# Patient Record
Sex: FEMALE | Race: WHITE | NOT HISPANIC OR LATINO | Employment: FULL TIME | URBAN - METROPOLITAN AREA
[De-identification: names, ages, dates, MRNs, and addresses within clinical notes are randomized per-mention and may not be internally consistent; named-entity substitution may affect disease eponyms.]

---

## 2017-07-24 ENCOUNTER — HOSPITAL ENCOUNTER (OUTPATIENT)
Dept: URGENT CARE | Facility: CLINIC | Age: 37
Setting detail: SPECIMEN
Discharge: HOME | End: 2017-07-24
Attending: FAMILY MEDICINE | Admitting: FAMILY MEDICINE

## 2017-07-28 ENCOUNTER — HOSPITAL ENCOUNTER (OUTPATIENT)
Dept: LAB | Facility: HOSPITAL | Age: 37
Discharge: HOME | End: 2017-07-28
Attending: FAMILY MEDICINE | Admitting: FAMILY MEDICINE

## 2017-07-28 LAB
ALBUMIN SERPL-MCNC: 4.5 G/DL (ref 3.5–4.8)
ALBUMIN/GLOB SERPL: 1.4 {RATIO} (ref 1–1.7)
ALP SERPL-CCNC: 36 IU/L (ref 32–91)
ALT SERPL-CCNC: 9 IU/L (ref 14–54)
ANION GAP SERPL CALC-SCNC: 14.9 MMOL/L (ref 10–20)
AST SERPL-CCNC: 17 IU/L (ref 15–41)
BASOPHILS # BLD AUTO: 0 10*3/UL (ref 0–0.2)
BASOPHILS NFR BLD AUTO: 1 % (ref 0–2)
BILIRUB SERPL-MCNC: 0.7 MG/DL (ref 0.3–1.2)
BUN SERPL-MCNC: 11 MG/DL (ref 8–20)
BUN/CREAT SERPL: 7.3 (ref 5.4–26.2)
CALCIUM SERPL-MCNC: 9.6 MG/DL (ref 8.9–10.3)
CHLORIDE SERPL-SCNC: 107 MMOL/L (ref 101–111)
CONV CO2: 20 MMOL/L (ref 22–32)
CONV TOTAL PROTEIN: 7.7 G/DL (ref 6.1–7.9)
CREAT UR-MCNC: 1.5 MG/DL (ref 0.4–1)
DIFFERENTIAL METHOD BLD: (no result)
EOSINOPHIL # BLD AUTO: 0.4 10*3/UL (ref 0–0.3)
EOSINOPHIL # BLD AUTO: 6 % (ref 0–3)
ERYTHROCYTE [DISTWIDTH] IN BLOOD BY AUTOMATED COUNT: 13.4 % (ref 11.5–14.5)
GLOBULIN UR ELPH-MCNC: 3.2 G/DL (ref 2.5–3.8)
GLUCOSE SERPL-MCNC: 86 MG/DL (ref 65–99)
H PYLORI IGG SER IA-ACNC: NEGATIVE
HCT VFR BLD AUTO: 44.8 % (ref 35–49)
HGB BLD-MCNC: 15.2 G/DL (ref 12–15)
IGA1 MFR SER: 211 MG/DL (ref 50–400)
IGG1 SER-MCNC: 1060 MG/DL (ref 600–1500)
IGM SERPL-MCNC: 97 MG/DL (ref 40–300)
LYMPHOCYTES # BLD AUTO: 2.4 10*3/UL (ref 0.8–4.8)
LYMPHOCYTES NFR BLD AUTO: 32 % (ref 18–42)
MCH RBC QN AUTO: 34.4 PG (ref 26–32)
MCHC RBC AUTO-ENTMCNC: 33.8 G/DL (ref 32–36)
MCV RBC AUTO: 101.7 FL (ref 80–94)
MONOCYTES # BLD AUTO: 0.6 10*3/UL (ref 0.1–1.3)
MONOCYTES NFR BLD AUTO: 8 % (ref 2–11)
NEUTROPHILS # BLD AUTO: 3.9 10*3/UL (ref 2.3–8.6)
NEUTROPHILS NFR BLD AUTO: 53 % (ref 50–75)
NRBC BLD AUTO-RTO: 0 /100{WBCS}
NRBC/RBC NFR BLD MANUAL: 0 10*3/UL
PLATELET # BLD AUTO: 221 10*3/UL (ref 150–450)
PMV BLD AUTO: 7.8 FL (ref 7.4–10.4)
POTASSIUM SERPL-SCNC: 3.9 MMOL/L (ref 3.6–5.1)
RBC # BLD AUTO: 4.4 10*6/UL (ref 4–5.4)
SODIUM SERPL-SCNC: 138 MMOL/L (ref 136–144)
WBC # BLD AUTO: 7.3 10*3/UL (ref 4.5–11.5)

## 2018-12-17 ENCOUNTER — OFFICE (OUTPATIENT)
Dept: URBAN - METROPOLITAN AREA CLINIC 64 | Facility: CLINIC | Age: 38
End: 2018-12-17

## 2018-12-17 VITALS
DIASTOLIC BLOOD PRESSURE: 70 MMHG | WEIGHT: 157 LBS | HEIGHT: 71 IN | HEART RATE: 82 BPM | SYSTOLIC BLOOD PRESSURE: 116 MMHG

## 2018-12-17 DIAGNOSIS — Z72.0 TOBACCO USE: ICD-10-CM

## 2018-12-17 DIAGNOSIS — R10.9 UNSPECIFIED ABDOMINAL PAIN: ICD-10-CM

## 2018-12-17 DIAGNOSIS — K59.00 CONSTIPATION, UNSPECIFIED: ICD-10-CM

## 2018-12-17 DIAGNOSIS — K21.9 GASTRO-ESOPHAGEAL REFLUX DISEASE WITHOUT ESOPHAGITIS: ICD-10-CM

## 2018-12-17 PROCEDURE — 99244 OFF/OP CNSLTJ NEW/EST MOD 40: CPT | Performed by: NURSE PRACTITIONER

## 2018-12-17 RX ORDER — DICYCLOMINE HYDROCHLORIDE 20 MG/1
40 TABLET ORAL
Qty: 60 | Refills: 11 | Status: COMPLETED
Start: 2018-12-17 | End: 2019-09-05

## 2018-12-17 RX ORDER — PANTOPRAZOLE SODIUM 40 MG/1
TABLET, DELAYED RELEASE ORAL
Qty: 30 | Refills: 11 | Status: COMPLETED
End: 2019-09-05

## 2018-12-17 RX ORDER — SORBITOL SOLUTION 70 %
SOLUTION, ORAL MISCELLANEOUS
Qty: 100 | Refills: 0 | Status: COMPLETED
Start: 2018-12-17 | End: 2019-09-05

## 2018-12-17 RX ORDER — PLECANATIDE 3 MG/1
3 TABLET ORAL
Qty: 90 | Refills: 3 | Status: COMPLETED
Start: 2018-12-17 | End: 2019-09-05

## 2019-03-06 ENCOUNTER — HOSPITAL ENCOUNTER (OUTPATIENT)
Dept: PREADMISSION TESTING | Facility: HOSPITAL | Age: 39
Discharge: HOME | End: 2019-03-06
Attending: OBSTETRICS & GYNECOLOGY | Admitting: OBSTETRICS & GYNECOLOGY

## 2019-03-06 LAB
ABO + RH BLD: NORMAL
ANION GAP SERPL CALC-SCNC: 15.1 MMOL/L (ref 10–20)
ARMBAND: NORMAL
BASOPHILS # BLD AUTO: 0.1 10*3/UL (ref 0–0.2)
BASOPHILS NFR BLD AUTO: 1 % (ref 0–2)
BLD COMPONENT TYPE: NORMAL
BLD GP AB SCN SERPL QL: NEGATIVE
BLOOD BANK CMNT PATIENT-IMP: NORMAL
BLOOD BANK CMNT PATIENT-IMP: NORMAL
BUN SERPL-MCNC: 9 MG/DL (ref 8–20)
BUN/CREAT SERPL: 15 (ref 5.4–26.2)
CALCIUM SERPL-MCNC: 9.8 MG/DL (ref 8.9–10.3)
CHLORIDE SERPL-SCNC: 101 MMOL/L (ref 101–111)
CONV CO2: 24 MMOL/L (ref 22–32)
CREAT UR-MCNC: 0.6 MG/DL (ref 0.4–1)
CROSSMATCH EXPIRATION: NORMAL
DIFFERENTIAL METHOD BLD: (no result)
EOSINOPHIL # BLD AUTO: 0.3 10*3/UL (ref 0–0.3)
EOSINOPHIL # BLD AUTO: 3 % (ref 0–3)
ERYTHROCYTE [DISTWIDTH] IN BLOOD BY AUTOMATED COUNT: 12.3 % (ref 11.5–14.5)
GLUCOSE SERPL-MCNC: 89 MG/DL (ref 65–99)
HCT VFR BLD AUTO: 42.1 % (ref 35–49)
HGB BLD-MCNC: 14.5 G/DL (ref 12–15)
LYMPHOCYTES # BLD AUTO: 2.3 10*3/UL (ref 0.8–4.8)
LYMPHOCYTES NFR BLD AUTO: 26 % (ref 18–42)
MCH RBC QN AUTO: 33.7 PG (ref 26–32)
MCHC RBC AUTO-ENTMCNC: 34.3 G/DL (ref 32–36)
MCV RBC AUTO: 98.2 FL (ref 80–94)
MONOCYTES # BLD AUTO: 0.5 10*3/UL (ref 0.1–1.3)
MONOCYTES NFR BLD AUTO: 6 % (ref 2–11)
NEUTROPHILS # BLD AUTO: 5.7 10*3/UL (ref 2.3–8.6)
NEUTROPHILS NFR BLD AUTO: 64 % (ref 50–75)
NRBC BLD AUTO-RTO: 0 /100{WBCS}
NRBC/RBC NFR BLD MANUAL: 0 10*3/UL
PLATELET # BLD AUTO: 223 10*3/UL (ref 150–450)
PMV BLD AUTO: 7.7 FL (ref 7.4–10.4)
POTASSIUM SERPL-SCNC: 4.1 MMOL/L (ref 3.6–5.1)
RBC # BLD AUTO: 4.29 10*6/UL (ref 4–5.4)
SODIUM SERPL-SCNC: 136 MMOL/L (ref 136–144)
WBC # BLD AUTO: 8.9 10*3/UL (ref 4.5–11.5)

## 2019-03-07 ENCOUNTER — HOSPITAL ENCOUNTER (OUTPATIENT)
Dept: PREOP | Facility: HOSPITAL | Age: 39
Setting detail: HOSPITAL OUTPATIENT SURGERY
Discharge: HOME | End: 2019-03-07
Attending: OBSTETRICS & GYNECOLOGY | Admitting: OBSTETRICS & GYNECOLOGY

## 2019-09-05 ENCOUNTER — OFFICE (OUTPATIENT)
Dept: URBAN - METROPOLITAN AREA CLINIC 64 | Facility: CLINIC | Age: 39
End: 2019-09-05

## 2019-09-05 VITALS
DIASTOLIC BLOOD PRESSURE: 89 MMHG | SYSTOLIC BLOOD PRESSURE: 116 MMHG | HEART RATE: 79 BPM | HEIGHT: 71 IN | WEIGHT: 165 LBS

## 2019-09-05 DIAGNOSIS — K59.00 CONSTIPATION, UNSPECIFIED: ICD-10-CM

## 2019-09-05 DIAGNOSIS — R10.30 LOWER ABDOMINAL PAIN, UNSPECIFIED: ICD-10-CM

## 2019-09-05 DIAGNOSIS — K21.9 GASTRO-ESOPHAGEAL REFLUX DISEASE WITHOUT ESOPHAGITIS: ICD-10-CM

## 2019-09-05 PROCEDURE — 99213 OFFICE O/P EST LOW 20 MIN: CPT | Performed by: NURSE PRACTITIONER

## 2019-09-05 RX ORDER — LINACLOTIDE 290 UG/1
290 CAPSULE, GELATIN COATED ORAL
Qty: 90 | Refills: 0 | Status: ACTIVE
Start: 2019-09-05

## 2019-09-05 RX ORDER — SORBITOL SOLUTION 70 %
SOLUTION, ORAL MISCELLANEOUS
Qty: 100 | Refills: 0 | Status: ACTIVE
Start: 2019-09-05

## 2020-11-04 ENCOUNTER — TELEMEDICINE (OUTPATIENT)
Dept: FAMILY MEDICINE CLINIC | Facility: TELEHEALTH | Age: 40
End: 2020-11-04

## 2020-11-04 VITALS — TEMPERATURE: 98.6 F

## 2020-11-04 DIAGNOSIS — J32.9 SINUSITIS, UNSPECIFIED CHRONICITY, UNSPECIFIED LOCATION: Primary | ICD-10-CM

## 2020-11-04 DIAGNOSIS — U07.1 COVID-19: ICD-10-CM

## 2020-11-04 PROCEDURE — 99213 OFFICE O/P EST LOW 20 MIN: CPT | Performed by: NURSE PRACTITIONER

## 2020-11-04 RX ORDER — VALACYCLOVIR HYDROCHLORIDE 500 MG/1
TABLET, FILM COATED ORAL
COMMUNITY
End: 2021-01-18 | Stop reason: SDUPTHER

## 2020-11-04 RX ORDER — FLUTICASONE PROPIONATE 50 MCG
2 SPRAY, SUSPENSION (ML) NASAL DAILY
Qty: 16 G | Refills: 0 | Status: SHIPPED | OUTPATIENT
Start: 2020-11-04 | End: 2021-03-30 | Stop reason: SDUPTHER

## 2020-11-04 RX ORDER — METHYLPREDNISOLONE 4 MG/1
TABLET ORAL
Qty: 1 EACH | Refills: 0 | OUTPATIENT
Start: 2020-11-04 | End: 2021-01-05

## 2020-11-04 RX ORDER — BUPROPION HYDROCHLORIDE 300 MG/1
300 TABLET ORAL EVERY MORNING
COMMUNITY
Start: 2018-08-04 | End: 2022-06-28 | Stop reason: HOSPADM

## 2020-11-04 RX ORDER — BUPROPION HYDROCHLORIDE 150 MG/1
150 TABLET ORAL EVERY MORNING
COMMUNITY
Start: 2018-08-04 | End: 2022-06-28 | Stop reason: HOSPADM

## 2020-11-04 RX ORDER — BENZONATATE 200 MG/1
200 CAPSULE ORAL 3 TIMES DAILY PRN
Qty: 30 CAPSULE | Refills: 0 | Status: SHIPPED | OUTPATIENT
Start: 2020-11-04 | End: 2020-11-14

## 2020-11-04 RX ORDER — DOXYCYCLINE HYCLATE 100 MG/1
100 CAPSULE ORAL 2 TIMES DAILY
Qty: 14 CAPSULE | Refills: 0 | Status: SHIPPED | OUTPATIENT
Start: 2020-11-04 | End: 2020-11-11

## 2020-11-04 NOTE — PROGRESS NOTES
Sasha Ye  1980    Chief Complaint   Patient presents with   • Cough     Covid positive   • Fever       HPI  Sasha Ye is a 40 y.o. female with complaints of chest congestion, dry cough, headache, nasal blockage, post nasal drip, sinus and nasal congestion, bilateral sinus pain and sore throat. She reports onset of symptoms on 10/28/2020 and she was tested for Covid and was positive. Symptoms began 1 week ago. Associated symptoms include facial pain bilateral maxillary and productive cough. She states that Headache, nasal congestion and sinus pain/pressure have increased over the last 2 days and she can't breath well through her nose, making it difficult for her to rest and feel more short of breath. She was using flonase but has been out for awhile. Patient denies chills and fever. Patient denies a history of asthma. Patient does smoke cigarettes.       The following portions of the patient's history were reviewed and updated as appropriate: allergies, current medications, past social history and problem list.    No past medical history on file.  Social History     Socioeconomic History   • Marital status: Single     Spouse name: Not on file   • Number of children: Not on file   • Years of education: Not on file   • Highest education level: Not on file       REVIEW OF SYSTEMS  History obtained from the patient  General ROS: negative for - chills or fever, positive for fatigue  ENT ROS: positive for - headaches, nasal congestion and sinus pain  negative for - sneezing, sore throat or vertigo  Allergy and Immunology ROS: positive for - nasal congestion, postnasal drip and seasonal allergies  negative for - itchy/watery eyes  Respiratory ROS: positive for - cough  negative for - shortness of breath or wheezing    PHYSICAL EXAM  Temp 98.6 °F (37 °C) (Oral)   LMP 11/04/2020 (Exact Date)   Breastfeeding No     CONSTITUTIONAL:alert, well appearing, and in no distress  ENT:pharynx erythematous without  exudate, maxillary sinus tenderness bilaterally  CHEST:respiratory effort normal  PSYCH:alert, normal affect and speech     Diagnoses and all orders for this visit:    1. Sinusitis, unspecified chronicity, unspecified location (Primary)  -     doxycycline (VIBRAMYCIN) 100 MG capsule; Take 1 capsule by mouth 2 (Two) Times a Day for 7 days.  Dispense: 14 capsule; Refill: 0  -     fluticasone (Flonase) 50 MCG/ACT nasal spray; 2 sprays into the nostril(s) as directed by provider Daily for 30 days. Administer 2 sprays in each nostril for each dose.  Dispense: 16 g; Refill: 0    2. COVID-19  -     methylPREDNISolone (MEDROL) 4 MG dose pack; Take as directed on package instructions.  Dispense: 1 each; Refill: 0  -     benzonatate (TESSALON) 200 MG capsule; Take 1 capsule by mouth 3 (Three) Times a Day As Needed for Cough for up to 10 days.  Dispense: 30 capsule; Refill: 0        [id number], [place] - [address] AT [facility] - [phone]  - [phone]   [address] [place] [address]  Phone: [phone] Fax: [phone]      This visit was performed via Telehealth.  Take antibiotic until gone. May take probiotic with antibiotic if prone to antibiotic induced diarrhea. Continue Flonase daily. Once infection is cleared, start Claritin, Zyrtec or Allegra daily and stay on through allergy season. Cool mist humidifier at bedside. Follow up with PCP, Urgent Care or Video Visit if symptoms have not resolved in 7-10 days. If symptoms worse, go to Urgent Care or follow up with PCP. If you develop high fever, chest pain, shortness of breath or any life-threatening symptoms, go to nearest Emergency Department.           Micaela Wakefield, APRN  11/04/20  15:10 EST

## 2020-11-04 NOTE — PATIENT INSTRUCTIONS
Sinusitis, Adult  Sinusitis is soreness and swelling (inflammation) of your sinuses. Sinuses are hollow spaces in the bones around your face. They are located:  · Around your eyes.  · In the middle of your forehead.  · Behind your nose.  · In your cheekbones.  Your sinuses and nasal passages are lined with a fluid called mucus. Mucus drains out of your sinuses. Swelling can trap mucus in your sinuses. This lets germs (bacteria, virus, or fungus) grow, which leads to infection. Most of the time, this condition is caused by a virus.  What are the causes?  This condition is caused by:  · Allergies.  · Asthma.  · Germs.  · Things that block your nose or sinuses.  · Growths in the nose (nasal polyps).  · Chemicals or irritants in the air.  · Fungus (rare).  What increases the risk?  You are more likely to develop this condition if:  · You have a weak body defense system (immune system).  · You do a lot of swimming or diving.  · You use nasal sprays too much.  · You smoke.  What are the signs or symptoms?  The main symptoms of this condition are pain and a feeling of pressure around the sinuses. Other symptoms include:  · Stuffy nose (congestion).  · Runny nose (drainage).  · Swelling and warmth in the sinuses.  · Headache.  · Toothache.  · A cough that may get worse at night.  · Mucus that collects in the throat or the back of the nose (postnasal drip).  · Being unable to smell and taste.  · Being very tired (fatigue).  · A fever.  · Sore throat.  · Bad breath.  How is this diagnosed?  This condition is diagnosed based on:  · Your symptoms.  · Your medical history.  · A physical exam.  · Tests to find out if your condition is short-term (acute) or long-term (chronic). Your doctor may:  ? Check your nose for growths (polyps).  ? Check your sinuses using a tool that has a light (endoscope).  ? Check for allergies or germs.  ? Do imaging tests, such as an MRI or CT scan.  How is this treated?  Treatment for this condition  depends on the cause and whether it is short-term or long-term.  · If caused by a virus, your symptoms should go away on their own within 10 days. You may be given medicines to relieve symptoms. They include:  ? Medicines that shrink swollen tissue in the nose.  ? Medicines that treat allergies (antihistamines).  ? A spray that treats swelling of the nostrils.   ? Rinses that help get rid of thick mucus in your nose (nasal saline washes).  · If caused by bacteria, your doctor may wait to see if you will get better without treatment. You may be given antibiotic medicine if you have:  ? A very bad infection.  ? A weak body defense system.  · If caused by growths in the nose, you may need to have surgery.  Follow these instructions at home:  Medicines  · Take, use, or apply over-the-counter and prescription medicines only as told by your doctor. These may include nasal sprays.  · If you were prescribed an antibiotic medicine, take it as told by your doctor. Do not stop taking the antibiotic even if you start to feel better.  Hydrate and humidify    · Drink enough water to keep your pee (urine) pale yellow.  · Use a cool mist humidifier to keep the humidity level in your home above 50%.  · Breathe in steam for 10-15 minutes, 3-4 times a day, or as told by your doctor. You can do this in the bathroom while a hot shower is running.  · Try not to spend time in cool or dry air.  Rest  · Rest as much as you can.  · Sleep with your head raised (elevated).  · Make sure you get enough sleep each night.  General instructions    · Put a warm, moist washcloth on your face 3-4 times a day, or as often as told by your doctor. This will help with discomfort.  · Wash your hands often with soap and water. If there is no soap and water, use hand .  · Do not smoke. Avoid being around people who are smoking (secondhand smoke).  · Keep all follow-up visits as told by your doctor. This is important.  Contact a doctor if:  · You  have a fever.  · Your symptoms get worse.  · Your symptoms do not get better within 10 days.  Get help right away if:  · You have a very bad headache.  · You cannot stop throwing up (vomiting).  · You have very bad pain or swelling around your face or eyes.  · You have trouble seeing.  · You feel confused.  · Your neck is stiff.  · You have trouble breathing.  Summary  · Sinusitis is swelling of your sinuses. Sinuses are hollow spaces in the bones around your face.  · This condition is caused by tissues in your nose that become inflamed or swollen. This traps germs. These can lead to infection.  · If you were prescribed an antibiotic medicine, take it as told by your doctor. Do not stop taking it even if you start to feel better.  · Keep all follow-up visits as told by your doctor. This is important.  This information is not intended to replace advice given to you by your health care provider. Make sure you discuss any questions you have with your health care provider.  Document Released: 06/05/2009 Document Revised: 05/20/2019 Document Reviewed: 05/20/2019  [employer] Patient Education © 2020 [employer]

## 2021-01-08 ENCOUNTER — TELEPHONE (OUTPATIENT)
Dept: URGENT CARE | Facility: CLINIC | Age: 41
End: 2021-01-08

## 2021-01-09 NOTE — TELEPHONE ENCOUNTER
Please contact the patient and encourage her to have blood tests drawn.  If she has any questions or concerns, I would be happy to speak with her.

## 2021-01-18 ENCOUNTER — OFFICE VISIT (OUTPATIENT)
Dept: FAMILY MEDICINE CLINIC | Facility: CLINIC | Age: 41
End: 2021-01-18

## 2021-01-18 VITALS
DIASTOLIC BLOOD PRESSURE: 87 MMHG | WEIGHT: 176 LBS | BODY MASS INDEX: 24.64 KG/M2 | HEART RATE: 86 BPM | HEIGHT: 71 IN | TEMPERATURE: 97.1 F | SYSTOLIC BLOOD PRESSURE: 122 MMHG

## 2021-01-18 DIAGNOSIS — Z00.00 WELLNESS EXAMINATION: ICD-10-CM

## 2021-01-18 DIAGNOSIS — U07.1 COVID-19 VIRUS INFECTION: ICD-10-CM

## 2021-01-18 PROCEDURE — 99385 PREV VISIT NEW AGE 18-39: CPT | Performed by: FAMILY MEDICINE

## 2021-01-18 RX ORDER — ZOLPIDEM TARTRATE 10 MG/1
10 TABLET ORAL NIGHTLY PRN
COMMUNITY
Start: 2020-12-04 | End: 2022-06-28 | Stop reason: HOSPADM

## 2021-01-18 RX ORDER — TOPIRAMATE 100 MG/1
300 TABLET, FILM COATED ORAL DAILY
COMMUNITY
Start: 2020-10-19 | End: 2022-06-28 | Stop reason: HOSPADM

## 2021-01-18 RX ORDER — VALACYCLOVIR HYDROCHLORIDE 1 G/1
1000 TABLET, FILM COATED ORAL DAILY
COMMUNITY
Start: 2021-01-14 | End: 2022-06-28 | Stop reason: HOSPADM

## 2021-01-18 RX ORDER — PREDNISONE 10 MG/1
10 TABLET ORAL 2 TIMES DAILY
Qty: 10 TABLET | Refills: 0 | Status: SHIPPED | OUTPATIENT
Start: 2021-01-18 | End: 2021-03-30

## 2021-01-18 NOTE — PROGRESS NOTES
"Subjective   Sasha Ye is a 40 y.o. female and is here for a comprehensive physical exam. The patient reports problems - she \"failed\" her biometric screen at work a few weeks ago - high cholesterol, high blood pressure.  She had COVID19 infection a few months ago and has had some shortness of breath since then.  Increased stress.  Her boyfriend's daughter had COVD a  few weeks ago. Irregular periods since COVID.  She is using mdi that has helped some. Weight gain..    Do you take any herbs or supplements that were not prescribed by a doctor? no  Are you taking calcium supplements? no  Are you taking aspirin daily? no    The following portions of the patient's history were reviewed and updated as appropriate: allergies, current medications, past family history, past medical history, past social history, past surgical history and problem list.    Review of Systems  Do you have pain that bothers you in your daily life? not asked  Pertinent items are noted in HPI.    Objective   /87 (BP Location: Left arm, Patient Position: Sitting, Cuff Size: Adult)   Pulse 86   Temp 97.1 °F (36.2 °C)   Ht 179.1 cm (70.5\")   Wt 79.8 kg (176 lb)   LMP 01/03/2021   BMI 24.90 kg/m²   General appearance: alert, appears stated age and cooperative  Head: Normocephalic, without obvious abnormality, atraumatic  Eyes: conjunctivae/corneas clear. PERRL, EOM's intact. Fundi benign.  Ears: normal TM's and external ear canals both ears  Throat: lips, mucosa, and tongue normal; teeth and gums normal  Neck: no adenopathy, no JVD, supple, symmetrical, trachea midline and thyroid not enlarged, symmetric, no tenderness/mass/nodules  Lungs: clear to auscultation bilaterally  Heart: regular rate and rhythm, S1, S2 normal, no murmur, click, rub or gallop  Abdomen: soft, non-tender; bowel sounds normal; no masses,  no organomegaly  Extremities: extremities normal, atraumatic, no cyanosis or edema  Skin: Skin color, texture, turgor normal. " No rashes or lesions  Neurologic: Grossly normal     No visits with results within 1 Week(s) from this visit.   Latest known visit with results is:   No results found for any previous visit.       Assessment/Plan   Healthy female exam.    Diagnoses and all orders for this visit:    1. Wellness examination    2. COVID-19 virus infection  -     predniSONE (DELTASONE) 10 MG tablet; Take 1 tablet by mouth 2 (Two) Times a Day.  Dispense: 10 tablet; Refill: 0      1. Well exam  2. Patient Counseling:  --Nutrition: Stressed importance of moderation in sodium/caffeine intake, saturated fat and cholesterol, caloric balance, sufficient intake of fresh fruits, vegetables, fiber, calcium, iron, and 1 mg of folate supplement per day (for females capable of pregnancy).  --Exercise: Stressed the importance of regular exercise.   --Dental health: Discussed importance of regular tooth brushing, flossing, and dental visits.  --Immunizations reviewed.    3. Discussed the patient's BMI with her.  The BMI is in the acceptable range  4. Follow up in one year

## 2021-03-30 ENCOUNTER — OFFICE VISIT (OUTPATIENT)
Dept: FAMILY MEDICINE CLINIC | Facility: CLINIC | Age: 41
End: 2021-03-30

## 2021-03-30 VITALS
SYSTOLIC BLOOD PRESSURE: 119 MMHG | BODY MASS INDEX: 24.75 KG/M2 | TEMPERATURE: 97.1 F | WEIGHT: 175 LBS | DIASTOLIC BLOOD PRESSURE: 87 MMHG | OXYGEN SATURATION: 97 % | HEART RATE: 92 BPM

## 2021-03-30 DIAGNOSIS — J01.00 ACUTE NON-RECURRENT MAXILLARY SINUSITIS: Primary | ICD-10-CM

## 2021-03-30 DIAGNOSIS — J32.9 SINUSITIS, UNSPECIFIED CHRONICITY, UNSPECIFIED LOCATION: ICD-10-CM

## 2021-03-30 PROCEDURE — 99213 OFFICE O/P EST LOW 20 MIN: CPT | Performed by: FAMILY MEDICINE

## 2021-03-30 RX ORDER — FLUTICASONE PROPIONATE 50 MCG
2 SPRAY, SUSPENSION (ML) NASAL DAILY
Qty: 16 G | Refills: 2 | Status: SHIPPED | OUTPATIENT
Start: 2021-03-30 | End: 2021-03-30

## 2021-03-30 RX ORDER — FLUTICASONE PROPIONATE 50 MCG
SPRAY, SUSPENSION (ML) NASAL
Qty: 48 G | Refills: 1 | Status: ON HOLD | OUTPATIENT
Start: 2021-03-30 | End: 2022-06-27

## 2021-03-30 RX ORDER — DOXYCYCLINE 100 MG/1
100 CAPSULE ORAL 2 TIMES DAILY
Qty: 20 CAPSULE | Refills: 0 | Status: ON HOLD | OUTPATIENT
Start: 2021-03-30 | End: 2022-06-27

## 2021-03-30 NOTE — PROGRESS NOTES
Chief Complaint  Sinusitis    Subjective          Sasha Ye presents to [facility]  Sinusitis  This is a new problem. The current episode started 1 to 4 weeks ago. The problem has been gradually worsening since onset. There has been no fever. Associated symptoms include congestion, ear pain, headaches, a hoarse voice, sinus pressure and a sore throat. Treatments tried: antihistamines. The treatment provided no relief.       Objective   Vital Signs:   /87 (BP Location: Left arm, Patient Position: Sitting, Cuff Size: Adult)   Pulse 92   Temp 97.1 °F (36.2 °C)   Wt 79.4 kg (175 lb)   SpO2 97%   BMI 24.75 kg/m²     Physical Exam  Vitals reviewed.   Constitutional:       General: She is not in acute distress.     Appearance: She is well-developed.   HENT:      Head: Normocephalic.      Left Ear: Tympanic membrane is injected.      Mouth/Throat:      Pharynx: Posterior oropharyngeal erythema present.   Eyes:      General: Lids are normal.      Conjunctiva/sclera: Conjunctivae normal.   Neck:      Thyroid: No thyroid mass or thyromegaly.      Trachea: Trachea normal.   Cardiovascular:      Rate and Rhythm: Normal rate and regular rhythm.      Heart sounds: Normal heart sounds.   Pulmonary:      Effort: Pulmonary effort is normal.      Breath sounds: Normal breath sounds.   Musculoskeletal:      Cervical back: Normal range of motion.   Lymphadenopathy:      Cervical: No cervical adenopathy.   Skin:     General: Skin is warm and dry.   Neurological:      Mental Status: She is alert and oriented to person, place, and time.   Psychiatric:         Attention and Perception: She is attentive.         Mood and Affect: Mood normal.         Speech: Speech normal.         Behavior: Behavior normal.        Result Review :                 Assessment and Plan    Diagnoses and all orders for this visit:    1. Acute non-recurrent maxillary sinusitis (Primary)  -     doxycycline (MONODOX)  100 MG capsule; Take 1 capsule by mouth 2 (Two) Times a Day.  Dispense: 20 capsule; Refill: 0    2. Sinusitis, unspecified chronicity, unspecified location  -     fluticasone (Flonase) 50 MCG/ACT nasal spray; 2 sprays into the nostril(s) as directed by provider Daily for 30 days. Administer 2 sprays in each nostril for each dose.  Dispense: 16 g; Refill: 2        Follow Up   No follow-ups on file.  Patient was given instructions and counseling regarding her condition or for health maintenance advice. Please see specific information pulled into the AVS if appropriate.

## 2022-06-26 ENCOUNTER — HOSPITAL ENCOUNTER (INPATIENT)
Facility: HOSPITAL | Age: 42
LOS: 2 days | Discharge: PSYCHIATRIC OR SUBSTANCE USE FACILITY | End: 2022-06-28
Attending: EMERGENCY MEDICINE | Admitting: INTERNAL MEDICINE

## 2022-06-26 DIAGNOSIS — T14.91XA SUICIDE ATTEMPT: Primary | ICD-10-CM

## 2022-06-26 DIAGNOSIS — T43.292A: ICD-10-CM

## 2022-06-26 PROBLEM — Z72.0 TOBACCO USE: Status: ACTIVE | Noted: 2022-06-26

## 2022-06-26 LAB
ALBUMIN SERPL-MCNC: 3.9 G/DL (ref 3.5–5.2)
ALBUMIN SERPL-MCNC: 4.4 G/DL (ref 3.5–5.2)
ALBUMIN/GLOB SERPL: 1.4 G/DL
ALBUMIN/GLOB SERPL: 1.9 G/DL
ALP SERPL-CCNC: 42 U/L (ref 39–117)
ALP SERPL-CCNC: 52 U/L (ref 39–117)
ALT SERPL W P-5'-P-CCNC: 10 U/L (ref 1–33)
ALT SERPL W P-5'-P-CCNC: 12 U/L (ref 1–33)
AMPHET+METHAMPHET UR QL: POSITIVE
ANION GAP SERPL CALCULATED.3IONS-SCNC: 15 MMOL/L (ref 5–15)
ANION GAP SERPL CALCULATED.3IONS-SCNC: 15 MMOL/L (ref 5–15)
APAP SERPL-MCNC: <5 MCG/ML (ref 0–30)
AST SERPL-CCNC: 18 U/L (ref 1–32)
AST SERPL-CCNC: 20 U/L (ref 1–32)
BARBITURATES UR QL SCN: NEGATIVE
BASOPHILS # BLD AUTO: 0 10*3/MM3 (ref 0–0.2)
BASOPHILS NFR BLD AUTO: 0.1 % (ref 0–1.5)
BASOPHILS NFR BLD AUTO: 0.2 % (ref 0–1.5)
BASOPHILS NFR BLD AUTO: 0.3 % (ref 0–1.5)
BENZODIAZ UR QL SCN: NEGATIVE
BILIRUB SERPL-MCNC: 0.2 MG/DL (ref 0–1.2)
BILIRUB SERPL-MCNC: 0.6 MG/DL (ref 0–1.2)
BUN SERPL-MCNC: 7 MG/DL (ref 6–20)
BUN SERPL-MCNC: 8 MG/DL (ref 6–20)
BUN/CREAT SERPL: 11.1 (ref 7–25)
BUN/CREAT SERPL: 8.8 (ref 7–25)
CA-I SERPL ISE-MCNC: 1.24 MMOL/L (ref 1.2–1.3)
CALCIUM SPEC-SCNC: 8.4 MG/DL (ref 8.6–10.5)
CALCIUM SPEC-SCNC: 8.4 MG/DL (ref 8.6–10.5)
CANNABINOIDS SERPL QL: POSITIVE
CHLORIDE SERPL-SCNC: 107 MMOL/L (ref 98–107)
CHLORIDE SERPL-SCNC: 109 MMOL/L (ref 98–107)
CK SERPL-CCNC: 221 U/L (ref 20–180)
CO2 SERPL-SCNC: 17 MMOL/L (ref 22–29)
CO2 SERPL-SCNC: 19 MMOL/L (ref 22–29)
COCAINE UR QL: NEGATIVE
CREAT SERPL-MCNC: 0.72 MG/DL (ref 0.57–1)
CREAT SERPL-MCNC: 0.8 MG/DL (ref 0.57–1)
D-LACTATE SERPL-SCNC: 1.2 MMOL/L (ref 0.5–2)
D-LACTATE SERPL-SCNC: 1.3 MMOL/L (ref 0.5–2)
DEPRECATED RDW RBC AUTO: 44.2 FL (ref 37–54)
DEPRECATED RDW RBC AUTO: 45.9 FL (ref 37–54)
DEPRECATED RDW RBC AUTO: 46.4 FL (ref 37–54)
EGFRCR SERPLBLD CKD-EPI 2021: 107.9 ML/MIN/1.73
EGFRCR SERPLBLD CKD-EPI 2021: 95.1 ML/MIN/1.73
EOSINOPHIL # BLD AUTO: 0 10*3/MM3 (ref 0–0.4)
EOSINOPHIL # BLD AUTO: 0 10*3/MM3 (ref 0–0.4)
EOSINOPHIL # BLD AUTO: 0.2 10*3/MM3 (ref 0–0.4)
EOSINOPHIL NFR BLD AUTO: 0.1 % (ref 0.3–6.2)
EOSINOPHIL NFR BLD AUTO: 0.1 % (ref 0.3–6.2)
EOSINOPHIL NFR BLD AUTO: 4 % (ref 0.3–6.2)
ERYTHROCYTE [DISTWIDTH] IN BLOOD BY AUTOMATED COUNT: 12.9 % (ref 12.3–15.4)
ERYTHROCYTE [DISTWIDTH] IN BLOOD BY AUTOMATED COUNT: 13.2 % (ref 12.3–15.4)
ERYTHROCYTE [DISTWIDTH] IN BLOOD BY AUTOMATED COUNT: 13.3 % (ref 12.3–15.4)
ETHANOL UR QL: 0.05 %
GLOBULIN UR ELPH-MCNC: 2.3 GM/DL
GLOBULIN UR ELPH-MCNC: 2.7 GM/DL
GLUCOSE SERPL-MCNC: 88 MG/DL (ref 65–99)
GLUCOSE SERPL-MCNC: 88 MG/DL (ref 65–99)
HCG INTACT+B SERPL-ACNC: <1 MIU/ML
HCT VFR BLD AUTO: 39.8 % (ref 34–46.6)
HCT VFR BLD AUTO: 41.1 % (ref 34–46.6)
HCT VFR BLD AUTO: 42.5 % (ref 34–46.6)
HGB BLD-MCNC: 13.7 G/DL (ref 12–15.9)
HGB BLD-MCNC: 14.2 G/DL (ref 12–15.9)
HGB BLD-MCNC: 14.7 G/DL (ref 12–15.9)
LYMPHOCYTES # BLD AUTO: 1.1 10*3/MM3 (ref 0.7–3.1)
LYMPHOCYTES # BLD AUTO: 1.4 10*3/MM3 (ref 0.7–3.1)
LYMPHOCYTES # BLD AUTO: 1.6 10*3/MM3 (ref 0.7–3.1)
LYMPHOCYTES NFR BLD AUTO: 10.6 % (ref 19.6–45.3)
LYMPHOCYTES NFR BLD AUTO: 14.8 % (ref 19.6–45.3)
LYMPHOCYTES NFR BLD AUTO: 28.4 % (ref 19.6–45.3)
MAGNESIUM SERPL-MCNC: 2 MG/DL (ref 1.6–2.6)
MAGNESIUM SERPL-MCNC: 2.2 MG/DL (ref 1.6–2.6)
MCH RBC QN AUTO: 33.9 PG (ref 26.6–33)
MCH RBC QN AUTO: 34 PG (ref 26.6–33)
MCH RBC QN AUTO: 34.3 PG (ref 26.6–33)
MCHC RBC AUTO-ENTMCNC: 34.4 G/DL (ref 31.5–35.7)
MCHC RBC AUTO-ENTMCNC: 34.5 G/DL (ref 31.5–35.7)
MCHC RBC AUTO-ENTMCNC: 34.5 G/DL (ref 31.5–35.7)
MCV RBC AUTO: 98.2 FL (ref 79–97)
MCV RBC AUTO: 98.8 FL (ref 79–97)
MCV RBC AUTO: 99.4 FL (ref 79–97)
METHADONE UR QL SCN: NEGATIVE
MONOCYTES # BLD AUTO: 0.4 10*3/MM3 (ref 0.1–0.9)
MONOCYTES # BLD AUTO: 0.6 10*3/MM3 (ref 0.1–0.9)
MONOCYTES # BLD AUTO: 0.6 10*3/MM3 (ref 0.1–0.9)
MONOCYTES NFR BLD AUTO: 10.9 % (ref 5–12)
MONOCYTES NFR BLD AUTO: 3.7 % (ref 5–12)
MONOCYTES NFR BLD AUTO: 6.6 % (ref 5–12)
NEUTROPHILS NFR BLD AUTO: 3.1 10*3/MM3 (ref 1.7–7)
NEUTROPHILS NFR BLD AUTO: 56.4 % (ref 42.7–76)
NEUTROPHILS NFR BLD AUTO: 7.5 10*3/MM3 (ref 1.7–7)
NEUTROPHILS NFR BLD AUTO: 78.4 % (ref 42.7–76)
NEUTROPHILS NFR BLD AUTO: 8.8 10*3/MM3 (ref 1.7–7)
NEUTROPHILS NFR BLD AUTO: 85.4 % (ref 42.7–76)
NRBC BLD AUTO-RTO: 0 /100 WBC (ref 0–0.2)
OPIATES UR QL: NEGATIVE
OXYCODONE UR QL SCN: NEGATIVE
PHOSPHATE SERPL-MCNC: 2.3 MG/DL (ref 2.5–4.5)
PLATELET # BLD AUTO: 224 10*3/MM3 (ref 140–450)
PLATELET # BLD AUTO: 230 10*3/MM3 (ref 140–450)
PLATELET # BLD AUTO: 250 10*3/MM3 (ref 140–450)
PMV BLD AUTO: 6.9 FL (ref 6–12)
PMV BLD AUTO: 7.4 FL (ref 6–12)
PMV BLD AUTO: 7.5 FL (ref 6–12)
POTASSIUM SERPL-SCNC: 3.6 MMOL/L (ref 3.5–5.2)
POTASSIUM SERPL-SCNC: 3.7 MMOL/L (ref 3.5–5.2)
PROT SERPL-MCNC: 6.6 G/DL (ref 6–8.5)
PROT SERPL-MCNC: 6.7 G/DL (ref 6–8.5)
RBC # BLD AUTO: 4.02 10*6/MM3 (ref 3.77–5.28)
RBC # BLD AUTO: 4.13 10*6/MM3 (ref 3.77–5.28)
RBC # BLD AUTO: 4.33 10*6/MM3 (ref 3.77–5.28)
SALICYLATES SERPL-MCNC: <0.3 MG/DL
SARS-COV-2 RNA PNL SPEC NAA+PROBE: NOT DETECTED
SODIUM SERPL-SCNC: 141 MMOL/L (ref 136–145)
SODIUM SERPL-SCNC: 141 MMOL/L (ref 136–145)
T4 FREE SERPL-MCNC: 1.28 NG/DL (ref 0.93–1.7)
TSH SERPL DL<=0.05 MIU/L-ACNC: 1.62 UIU/ML (ref 0.27–4.2)
TSH SERPL DL<=0.05 MIU/L-ACNC: 3.09 UIU/ML (ref 0.27–4.2)
WBC NRBC COR # BLD: 10.2 10*3/MM3 (ref 3.4–10.8)
WBC NRBC COR # BLD: 5.5 10*3/MM3 (ref 3.4–10.8)
WBC NRBC COR # BLD: 9.6 10*3/MM3 (ref 3.4–10.8)

## 2022-06-26 PROCEDURE — 80143 DRUG ASSAY ACETAMINOPHEN: CPT | Performed by: EMERGENCY MEDICINE

## 2022-06-26 PROCEDURE — 99220 PR INITIAL OBSERVATION CARE/DAY 70 MINUTES: CPT | Performed by: HOSPITALIST

## 2022-06-26 PROCEDURE — 82330 ASSAY OF CALCIUM: CPT | Performed by: NURSE PRACTITIONER

## 2022-06-26 PROCEDURE — 80307 DRUG TEST PRSMV CHEM ANLYZR: CPT | Performed by: EMERGENCY MEDICINE

## 2022-06-26 PROCEDURE — 80053 COMPREHEN METABOLIC PANEL: CPT | Performed by: NURSE PRACTITIONER

## 2022-06-26 PROCEDURE — 87635 SARS-COV-2 COVID-19 AMP PRB: CPT | Performed by: EMERGENCY MEDICINE

## 2022-06-26 PROCEDURE — 85025 COMPLETE CBC W/AUTO DIFF WBC: CPT | Performed by: EMERGENCY MEDICINE

## 2022-06-26 PROCEDURE — 83735 ASSAY OF MAGNESIUM: CPT | Performed by: NURSE PRACTITIONER

## 2022-06-26 PROCEDURE — 84439 ASSAY OF FREE THYROXINE: CPT | Performed by: HOSPITALIST

## 2022-06-26 PROCEDURE — 25010000002 DIPHENHYDRAMINE PER 50 MG: Performed by: HOSPITALIST

## 2022-06-26 PROCEDURE — G0378 HOSPITAL OBSERVATION PER HR: HCPCS

## 2022-06-26 PROCEDURE — 99284 EMERGENCY DEPT VISIT MOD MDM: CPT

## 2022-06-26 PROCEDURE — 25010000002 ZIPRASIDONE MESYLATE PER 10 MG: Performed by: HOSPITALIST

## 2022-06-26 PROCEDURE — 84443 ASSAY THYROID STIM HORMONE: CPT | Performed by: EMERGENCY MEDICINE

## 2022-06-26 PROCEDURE — 82077 ASSAY SPEC XCP UR&BREATH IA: CPT | Performed by: EMERGENCY MEDICINE

## 2022-06-26 PROCEDURE — 80053 COMPREHEN METABOLIC PANEL: CPT | Performed by: EMERGENCY MEDICINE

## 2022-06-26 PROCEDURE — 83036 HEMOGLOBIN GLYCOSYLATED A1C: CPT | Performed by: HOSPITALIST

## 2022-06-26 PROCEDURE — 84100 ASSAY OF PHOSPHORUS: CPT | Performed by: NURSE PRACTITIONER

## 2022-06-26 PROCEDURE — 25010000002 DIAZEPAM PER 5 MG: Performed by: HOSPITALIST

## 2022-06-26 PROCEDURE — 87040 BLOOD CULTURE FOR BACTERIA: CPT | Performed by: NURSE PRACTITIONER

## 2022-06-26 PROCEDURE — 84702 CHORIONIC GONADOTROPIN TEST: CPT | Performed by: EMERGENCY MEDICINE

## 2022-06-26 PROCEDURE — 93010 ELECTROCARDIOGRAM REPORT: CPT | Performed by: INTERNAL MEDICINE

## 2022-06-26 PROCEDURE — 84443 ASSAY THYROID STIM HORMONE: CPT | Performed by: HOSPITALIST

## 2022-06-26 PROCEDURE — 83605 ASSAY OF LACTIC ACID: CPT | Performed by: NURSE PRACTITIONER

## 2022-06-26 PROCEDURE — 93005 ELECTROCARDIOGRAM TRACING: CPT | Performed by: EMERGENCY MEDICINE

## 2022-06-26 PROCEDURE — 80179 DRUG ASSAY SALICYLATE: CPT | Performed by: EMERGENCY MEDICINE

## 2022-06-26 PROCEDURE — 93005 ELECTROCARDIOGRAM TRACING: CPT | Performed by: NURSE PRACTITIONER

## 2022-06-26 PROCEDURE — 82550 ASSAY OF CK (CPK): CPT | Performed by: NURSE PRACTITIONER

## 2022-06-26 PROCEDURE — 85025 COMPLETE CBC W/AUTO DIFF WBC: CPT | Performed by: NURSE PRACTITIONER

## 2022-06-26 PROCEDURE — 99252 IP/OBS CONSLTJ NEW/EST SF 35: CPT | Performed by: NURSE PRACTITIONER

## 2022-06-26 RX ORDER — ACETAMINOPHEN 650 MG/1
650 SUPPOSITORY RECTAL EVERY 4 HOURS PRN
Status: DISCONTINUED | OUTPATIENT
Start: 2022-06-26 | End: 2022-06-28 | Stop reason: HOSPADM

## 2022-06-26 RX ORDER — ALBUTEROL SULFATE 2.5 MG/3ML
2.5 SOLUTION RESPIRATORY (INHALATION) EVERY 4 HOURS PRN
Status: DISCONTINUED | OUTPATIENT
Start: 2022-06-26 | End: 2022-06-28 | Stop reason: HOSPADM

## 2022-06-26 RX ORDER — SODIUM CHLORIDE 0.9 % (FLUSH) 0.9 %
10 SYRINGE (ML) INJECTION AS NEEDED
Status: DISCONTINUED | OUTPATIENT
Start: 2022-06-26 | End: 2022-06-28 | Stop reason: HOSPADM

## 2022-06-26 RX ORDER — ACETAMINOPHEN 160 MG/5ML
650 SOLUTION ORAL EVERY 4 HOURS PRN
Status: DISCONTINUED | OUTPATIENT
Start: 2022-06-26 | End: 2022-06-28 | Stop reason: HOSPADM

## 2022-06-26 RX ORDER — DIAZEPAM 5 MG/ML
5 INJECTION, SOLUTION INTRAMUSCULAR; INTRAVENOUS EVERY 4 HOURS PRN
Status: DISCONTINUED | OUTPATIENT
Start: 2022-06-26 | End: 2022-06-28 | Stop reason: HOSPADM

## 2022-06-26 RX ORDER — ENOXAPARIN SODIUM 100 MG/ML
40 INJECTION SUBCUTANEOUS EVERY 24 HOURS
Status: DISCONTINUED | OUTPATIENT
Start: 2022-06-26 | End: 2022-06-26

## 2022-06-26 RX ORDER — ONDANSETRON 2 MG/ML
4 INJECTION INTRAMUSCULAR; INTRAVENOUS EVERY 4 HOURS PRN
Status: DISCONTINUED | OUTPATIENT
Start: 2022-06-26 | End: 2022-06-28 | Stop reason: HOSPADM

## 2022-06-26 RX ORDER — DIAZEPAM 5 MG/ML
2 INJECTION, SOLUTION INTRAMUSCULAR; INTRAVENOUS ONCE
Status: COMPLETED | OUTPATIENT
Start: 2022-06-26 | End: 2022-06-26

## 2022-06-26 RX ORDER — DIPHENHYDRAMINE HYDROCHLORIDE 50 MG/ML
50 INJECTION INTRAMUSCULAR; INTRAVENOUS ONCE
Status: COMPLETED | OUTPATIENT
Start: 2022-06-26 | End: 2022-06-26

## 2022-06-26 RX ORDER — SODIUM CHLORIDE 9 MG/ML
75 INJECTION, SOLUTION INTRAVENOUS CONTINUOUS
Status: DISCONTINUED | OUTPATIENT
Start: 2022-06-26 | End: 2022-06-28 | Stop reason: HOSPADM

## 2022-06-26 RX ORDER — LORAZEPAM 2 MG/ML
1 INJECTION INTRAMUSCULAR ONCE
Status: DISCONTINUED | OUTPATIENT
Start: 2022-06-26 | End: 2022-06-26

## 2022-06-26 RX ORDER — ACETAMINOPHEN 325 MG/1
650 TABLET ORAL EVERY 4 HOURS PRN
Status: DISCONTINUED | OUTPATIENT
Start: 2022-06-26 | End: 2022-06-28 | Stop reason: HOSPADM

## 2022-06-26 RX ORDER — DEXMEDETOMIDINE HYDROCHLORIDE 4 UG/ML
.2-1.5 INJECTION, SOLUTION INTRAVENOUS
Status: DISCONTINUED | OUTPATIENT
Start: 2022-06-26 | End: 2022-06-26

## 2022-06-26 RX ORDER — SODIUM CHLORIDE 0.9 % (FLUSH) 0.9 %
10 SYRINGE (ML) INJECTION EVERY 12 HOURS SCHEDULED
Status: DISCONTINUED | OUTPATIENT
Start: 2022-06-26 | End: 2022-06-28 | Stop reason: HOSPADM

## 2022-06-26 RX ORDER — MIDAZOLAM IN NACL,ISO-OSMOT/PF 50 MG/50ML
1-2 INFUSION BOTTLE (ML) INTRAVENOUS
Status: DISCONTINUED | OUTPATIENT
Start: 2022-06-26 | End: 2022-06-28

## 2022-06-26 RX ADMIN — SODIUM CHLORIDE 75 ML/HR: 9 INJECTION, SOLUTION INTRAVENOUS at 12:56

## 2022-06-26 RX ADMIN — MIDAZOLAM HYDROCHLORIDE 0.5 MG/HR: 1 INJECTION, SOLUTION INTRAVENOUS at 18:10

## 2022-06-26 RX ADMIN — Medication 10 ML: at 22:05

## 2022-06-26 RX ADMIN — SODIUM CHLORIDE 1000 ML: 9 INJECTION, SOLUTION INTRAVENOUS at 10:55

## 2022-06-26 RX ADMIN — DIAZEPAM 2 MG: 10 INJECTION, SOLUTION INTRAMUSCULAR; INTRAVENOUS at 14:34

## 2022-06-26 RX ADMIN — DIPHENHYDRAMINE HYDROCHLORIDE 50 MG: 50 INJECTION, SOLUTION INTRAMUSCULAR; INTRAVENOUS at 16:48

## 2022-06-26 RX ADMIN — ACETAMINOPHEN 650 MG: 325 TABLET, FILM COATED ORAL at 14:34

## 2022-06-27 LAB
D-LACTATE SERPL-SCNC: 0.9 MMOL/L (ref 0.5–2)
GLUCOSE BLDC GLUCOMTR-MCNC: 89 MG/DL (ref 70–105)
HBA1C MFR BLD: 4.5 % (ref 3.5–5.6)
POTASSIUM SERPL-SCNC: 3.6 MMOL/L (ref 3.5–5.2)
QT INTERVAL: 308 MS
QT INTERVAL: 376 MS
QT INTERVAL: 388 MS

## 2022-06-27 PROCEDURE — 84132 ASSAY OF SERUM POTASSIUM: CPT | Performed by: INTERNAL MEDICINE

## 2022-06-27 PROCEDURE — 99254 IP/OBS CNSLTJ NEW/EST MOD 60: CPT | Performed by: PHYSICIAN ASSISTANT

## 2022-06-27 PROCEDURE — 83605 ASSAY OF LACTIC ACID: CPT | Performed by: NURSE PRACTITIONER

## 2022-06-27 PROCEDURE — 82962 GLUCOSE BLOOD TEST: CPT

## 2022-06-27 PROCEDURE — 93010 ELECTROCARDIOGRAM REPORT: CPT | Performed by: INTERNAL MEDICINE

## 2022-06-27 PROCEDURE — 93005 ELECTROCARDIOGRAM TRACING: CPT | Performed by: NURSE PRACTITIONER

## 2022-06-27 PROCEDURE — 99222 1ST HOSP IP/OBS MODERATE 55: CPT | Performed by: PSYCHIATRY & NEUROLOGY

## 2022-06-27 PROCEDURE — 93005 ELECTROCARDIOGRAM TRACING: CPT | Performed by: INTERNAL MEDICINE

## 2022-06-27 RX ORDER — POTASSIUM CHLORIDE 20 MEQ/1
40 TABLET, EXTENDED RELEASE ORAL AS NEEDED
Status: DISCONTINUED | OUTPATIENT
Start: 2022-06-27 | End: 2022-06-28 | Stop reason: HOSPADM

## 2022-06-27 RX ORDER — POTASSIUM CHLORIDE 7.45 MG/ML
10 INJECTION INTRAVENOUS
Status: DISCONTINUED | OUTPATIENT
Start: 2022-06-27 | End: 2022-06-28 | Stop reason: HOSPADM

## 2022-06-27 RX ORDER — POTASSIUM CHLORIDE 1.5 G/1.77G
40 POWDER, FOR SOLUTION ORAL AS NEEDED
Status: DISCONTINUED | OUTPATIENT
Start: 2022-06-27 | End: 2022-06-28 | Stop reason: HOSPADM

## 2022-06-27 RX ADMIN — Medication 10 ML: at 08:39

## 2022-06-27 RX ADMIN — Medication 10 ML: at 21:21

## 2022-06-27 RX ADMIN — POTASSIUM CHLORIDE 40 MEQ: 1.5 POWDER, FOR SOLUTION ORAL at 21:21

## 2022-06-27 RX ADMIN — Medication 2 PACKET: at 10:06

## 2022-06-28 VITALS
DIASTOLIC BLOOD PRESSURE: 77 MMHG | OXYGEN SATURATION: 98 % | HEIGHT: 71 IN | BODY MASS INDEX: 21.94 KG/M2 | SYSTOLIC BLOOD PRESSURE: 110 MMHG | TEMPERATURE: 98 F | RESPIRATION RATE: 20 BRPM | WEIGHT: 156.75 LBS | HEART RATE: 83 BPM

## 2022-06-28 LAB
ALBUMIN SERPL-MCNC: 4 G/DL (ref 3.5–5.2)
ALBUMIN/GLOB SERPL: 1.5 G/DL
ALP SERPL-CCNC: 42 U/L (ref 39–117)
ALT SERPL W P-5'-P-CCNC: 12 U/L (ref 1–33)
ANION GAP SERPL CALCULATED.3IONS-SCNC: 12 MMOL/L (ref 5–15)
AST SERPL-CCNC: 22 U/L (ref 1–32)
BASOPHILS # BLD AUTO: 0 10*3/MM3 (ref 0–0.2)
BASOPHILS NFR BLD AUTO: 0.5 % (ref 0–1.5)
BILIRUB SERPL-MCNC: 0.6 MG/DL (ref 0–1.2)
BUN SERPL-MCNC: 6 MG/DL (ref 6–20)
BUN/CREAT SERPL: 7.7 (ref 7–25)
CALCIUM SPEC-SCNC: 8.9 MG/DL (ref 8.6–10.5)
CHLORIDE SERPL-SCNC: 108 MMOL/L (ref 98–107)
CO2 SERPL-SCNC: 20 MMOL/L (ref 22–29)
CREAT SERPL-MCNC: 0.78 MG/DL (ref 0.57–1)
DEPRECATED RDW RBC AUTO: 46.8 FL (ref 37–54)
EGFRCR SERPLBLD CKD-EPI 2021: 98 ML/MIN/1.73
EOSINOPHIL # BLD AUTO: 0.1 10*3/MM3 (ref 0–0.4)
EOSINOPHIL NFR BLD AUTO: 2.1 % (ref 0.3–6.2)
ERYTHROCYTE [DISTWIDTH] IN BLOOD BY AUTOMATED COUNT: 13.4 % (ref 12.3–15.4)
GLOBULIN UR ELPH-MCNC: 2.6 GM/DL
GLUCOSE SERPL-MCNC: 92 MG/DL (ref 65–99)
HCT VFR BLD AUTO: 39.3 % (ref 34–46.6)
HGB BLD-MCNC: 13.8 G/DL (ref 12–15.9)
LYMPHOCYTES # BLD AUTO: 2.3 10*3/MM3 (ref 0.7–3.1)
LYMPHOCYTES NFR BLD AUTO: 38.9 % (ref 19.6–45.3)
MAGNESIUM SERPL-MCNC: 2.1 MG/DL (ref 1.6–2.6)
MCH RBC QN AUTO: 35 PG (ref 26.6–33)
MCHC RBC AUTO-ENTMCNC: 35.2 G/DL (ref 31.5–35.7)
MCV RBC AUTO: 99.5 FL (ref 79–97)
MONOCYTES # BLD AUTO: 0.6 10*3/MM3 (ref 0.1–0.9)
MONOCYTES NFR BLD AUTO: 10 % (ref 5–12)
NEUTROPHILS NFR BLD AUTO: 2.9 10*3/MM3 (ref 1.7–7)
NEUTROPHILS NFR BLD AUTO: 48.5 % (ref 42.7–76)
NRBC BLD AUTO-RTO: 0 /100 WBC (ref 0–0.2)
PLATELET # BLD AUTO: 176 10*3/MM3 (ref 140–450)
PMV BLD AUTO: 7.3 FL (ref 6–12)
POTASSIUM SERPL-SCNC: 3.9 MMOL/L (ref 3.5–5.2)
PROT SERPL-MCNC: 6.6 G/DL (ref 6–8.5)
RBC # BLD AUTO: 3.95 10*6/MM3 (ref 3.77–5.28)
SODIUM SERPL-SCNC: 140 MMOL/L (ref 136–145)
WBC NRBC COR # BLD: 6 10*3/MM3 (ref 3.4–10.8)

## 2022-06-28 PROCEDURE — 99232 SBSQ HOSP IP/OBS MODERATE 35: CPT | Performed by: PSYCHIATRY & NEUROLOGY

## 2022-06-28 PROCEDURE — 85025 COMPLETE CBC W/AUTO DIFF WBC: CPT | Performed by: NURSE PRACTITIONER

## 2022-06-28 PROCEDURE — 83735 ASSAY OF MAGNESIUM: CPT | Performed by: NURSE PRACTITIONER

## 2022-06-28 PROCEDURE — 80053 COMPREHEN METABOLIC PANEL: CPT | Performed by: NURSE PRACTITIONER

## 2022-06-28 PROCEDURE — 99217 PR OBSERVATION CARE DISCHARGE MANAGEMENT: CPT | Performed by: NURSE PRACTITIONER

## 2022-06-28 RX ADMIN — POTASSIUM CHLORIDE 40 MEQ: 1.5 POWDER, FOR SOLUTION ORAL at 01:09

## 2022-06-28 RX ADMIN — Medication 10 ML: at 07:57

## 2022-07-01 LAB
BACTERIA SPEC AEROBE CULT: NORMAL
BACTERIA SPEC AEROBE CULT: NORMAL

## 2022-07-15 LAB — QT INTERVAL: 375 MS

## 2022-10-11 ENCOUNTER — OFFICE VISIT (OUTPATIENT)
Dept: FAMILY MEDICINE CLINIC | Facility: CLINIC | Age: 42
End: 2022-10-11

## 2022-10-11 ENCOUNTER — LAB (OUTPATIENT)
Dept: FAMILY MEDICINE CLINIC | Facility: CLINIC | Age: 42
End: 2022-10-11

## 2022-10-11 DIAGNOSIS — R59.0 CERVICAL LYMPHADENOPATHY: Primary | ICD-10-CM

## 2022-10-11 DIAGNOSIS — J06.9 UPPER RESPIRATORY TRACT INFECTION, UNSPECIFIED TYPE: ICD-10-CM

## 2022-10-11 DIAGNOSIS — T63.301A SPIDER BITE WOUND, ACCIDENTAL OR UNINTENTIONAL, INITIAL ENCOUNTER: ICD-10-CM

## 2022-10-11 PROCEDURE — 85025 COMPLETE CBC W/AUTO DIFF WBC: CPT | Performed by: FAMILY MEDICINE

## 2022-10-11 PROCEDURE — 80053 COMPREHEN METABOLIC PANEL: CPT | Performed by: FAMILY MEDICINE

## 2022-10-11 PROCEDURE — 36415 COLL VENOUS BLD VENIPUNCTURE: CPT | Performed by: FAMILY MEDICINE

## 2022-10-11 PROCEDURE — 99213 OFFICE O/P EST LOW 20 MIN: CPT | Performed by: FAMILY MEDICINE

## 2022-10-11 RX ORDER — DOXYCYCLINE 100 MG/1
100 CAPSULE ORAL 2 TIMES DAILY
Qty: 20 CAPSULE | Refills: 0 | Status: SHIPPED | OUTPATIENT
Start: 2022-10-11

## 2022-10-11 RX ORDER — FLUTICASONE PROPIONATE 50 MCG
2 SPRAY, SUSPENSION (ML) NASAL DAILY
Qty: 18.2 ML | Refills: 1 | Status: SHIPPED | OUTPATIENT
Start: 2022-10-11 | End: 2022-10-12

## 2022-10-11 RX ORDER — TOPIRAMATE 100 MG/1
TABLET, FILM COATED ORAL
COMMUNITY
Start: 2022-09-02

## 2022-10-11 RX ORDER — ESCITALOPRAM OXALATE 10 MG/1
TABLET ORAL
COMMUNITY
Start: 2022-08-12

## 2022-10-11 RX ORDER — FLUCONAZOLE 150 MG/1
150 TABLET ORAL ONCE
Qty: 1 TABLET | Refills: 0 | Status: SHIPPED | OUTPATIENT
Start: 2022-10-11 | End: 2022-10-11

## 2022-10-11 RX ORDER — ZOLPIDEM TARTRATE 10 MG/1
TABLET ORAL
COMMUNITY
Start: 2022-08-15

## 2022-10-11 RX ORDER — GLYCOPYRRONIUM 2.4 G/100G
CLOTH TOPICAL
COMMUNITY
Start: 2022-08-11

## 2022-10-11 NOTE — PROGRESS NOTES
"Chief Complaint  Edema (Lymph nodes-on going for 1 week ), Headache (all symptoms have been on going for 2 months ), Cough, Nasal Congestion, and Fatigue    Subjective        Sasha Ye presents to [facility]  History of Present Illness  She has had COVID twice and it did not feel like this.  Increased fatigue. She got bit by a spider on the back of her neck and that is when she noticed a swollen lymph node in her neck and under her arms.   Cough  This is a new problem. The current episode started in the past 7 days. The problem has been unchanged. The cough is productive of purulent sputum. Associated symptoms include a fever and headaches.   Fatigue  Associated symptoms include coughing, fatigue, a fever and headaches.   Sinusitis  Associated symptoms include coughing and headaches.       Objective   Vital Signs:  /89 (BP Location: Right arm, Patient Position: Sitting, Cuff Size: Adult)   Pulse 80   Temp 98.4 °F (36.9 °C) (Infrared)   Ht 180.3 cm (71\")   Wt 74.4 kg (164 lb)   SpO2 98%   BMI 22.87 kg/m²   Estimated body mass index is 22.87 kg/m² as calculated from the following:    Height as of this encounter: 180.3 cm (71\").    Weight as of this encounter: 74.4 kg (164 lb).    BMI is within normal parameters. No other follow-up for BMI required.      Physical Exam  Vitals and nursing note reviewed.   Constitutional:       General: She is not in acute distress.     Appearance: She is well-developed.   HENT:      Head: Normocephalic.      Right Ear: Tympanic membrane normal.      Left Ear: Tympanic membrane normal.      Nose: Congestion present.      Mouth/Throat:      Pharynx: Posterior oropharyngeal erythema present.   Eyes:      General: Lids are normal.      Conjunctiva/sclera: Conjunctivae normal.   Neck:      Thyroid: No thyroid mass or thyromegaly.      Trachea: Trachea normal.   Cardiovascular:      Rate and Rhythm: Normal rate and regular rhythm.      " Heart sounds: Normal heart sounds.   Pulmonary:      Effort: Pulmonary effort is normal.      Breath sounds: Normal breath sounds.   Musculoskeletal:      Cervical back: Normal range of motion.   Lymphadenopathy:      Cervical: Cervical adenopathy present.   Skin:     General: Skin is warm and dry.   Neurological:      Mental Status: She is alert and oriented to person, place, and time.   Psychiatric:         Attention and Perception: She is attentive.         Mood and Affect: Mood normal.         Speech: Speech normal.         Behavior: Behavior normal.        Result Review :  The following data was reviewed by: Mi Celis MD on 10/11/2022:  Common labs    Common Labs 6/27/22 6/28/22 6/28/22 10/11/22 10/11/22     0435 [id number]   Glucose   92  82   BUN   6  8   Creatinine   0.78  0.84   Sodium   140  140   Potassium 3.6  3.9  4.3   Chloride   108 (A)  106   Calcium   8.9  9.5   Albumin   4.00  4.40   Total Bilirubin   0.6  0.3   Alkaline Phosphatase   42  39   AST (SGOT)   22  19   ALT (SGPT)   12  10   WBC  6.00  6.17    Hemoglobin  13.8  14.7    Hematocrit  39.3  42.4    Platelets  176  168    (A) Abnormal value                      Assessment and Plan   Diagnoses and all orders for this visit:    1. Cervical lymphadenopathy (Primary)  -     CBC & Differential  -     Comprehensive Metabolic Panel    2. Upper respiratory tract infection, unspecified type  -     CBC & Differential  -     Comprehensive Metabolic Panel    3. Spider bite wound, accidental or unintentional, initial encounter  -     CBC & Differential  -     Comprehensive Metabolic Panel    Other orders  -     doxycycline (MONODOX) 100 MG capsule; Take 1 capsule by mouth 2 (Two) Times a Day.  Dispense: 20 capsule; Refill: 0  -     Discontinue: fluticasone (Flonase) 50 MCG/ACT nasal spray; 2 sprays into the nostril(s) as directed by provider Daily.  Dispense: 18.2 mL; Refill: 1  -     fluconazole (Diflucan) 150 MG tablet; Take 1 tablet by  mouth 1 (One) Time for 1 dose.  Dispense: 1 tablet; Refill: 0             Follow Up   No follow-ups on file.  Patient was given instructions and counseling regarding her condition or for health maintenance advice. Please see specific information pulled into the AVS if appropriate.

## 2022-10-12 LAB
ALBUMIN SERPL-MCNC: 4.4 G/DL (ref 3.5–5.2)
ALBUMIN/GLOB SERPL: 1.9 G/DL
ALP SERPL-CCNC: 39 U/L (ref 39–117)
ALT SERPL W P-5'-P-CCNC: 10 U/L (ref 1–33)
ANION GAP SERPL CALCULATED.3IONS-SCNC: 10.3 MMOL/L (ref 5–15)
AST SERPL-CCNC: 19 U/L (ref 1–32)
BASOPHILS # BLD AUTO: 0.04 10*3/MM3 (ref 0–0.2)
BASOPHILS NFR BLD AUTO: 0.6 % (ref 0–1.5)
BILIRUB SERPL-MCNC: 0.3 MG/DL (ref 0–1.2)
BUN SERPL-MCNC: 8 MG/DL (ref 6–20)
BUN/CREAT SERPL: 9.5 (ref 7–25)
CALCIUM SPEC-SCNC: 9.5 MG/DL (ref 8.6–10.5)
CHLORIDE SERPL-SCNC: 106 MMOL/L (ref 98–107)
CO2 SERPL-SCNC: 23.7 MMOL/L (ref 22–29)
CREAT SERPL-MCNC: 0.84 MG/DL (ref 0.57–1)
DEPRECATED RDW RBC AUTO: 42.1 FL (ref 37–54)
EGFRCR SERPLBLD CKD-EPI 2021: 89.7 ML/MIN/1.73
EOSINOPHIL # BLD AUTO: 0.63 10*3/MM3 (ref 0–0.4)
EOSINOPHIL NFR BLD AUTO: 10.2 % (ref 0.3–6.2)
ERYTHROCYTE [DISTWIDTH] IN BLOOD BY AUTOMATED COUNT: 11.7 % (ref 12.3–15.4)
GLOBULIN UR ELPH-MCNC: 2.3 GM/DL
GLUCOSE SERPL-MCNC: 82 MG/DL (ref 65–99)
HCT VFR BLD AUTO: 42.4 % (ref 34–46.6)
HGB BLD-MCNC: 14.7 G/DL (ref 12–15.9)
IMM GRANULOCYTES # BLD AUTO: 0.03 10*3/MM3 (ref 0–0.05)
IMM GRANULOCYTES NFR BLD AUTO: 0.5 % (ref 0–0.5)
LYMPHOCYTES # BLD AUTO: 2.04 10*3/MM3 (ref 0.7–3.1)
LYMPHOCYTES NFR BLD AUTO: 33.1 % (ref 19.6–45.3)
MCH RBC QN AUTO: 34.3 PG (ref 26.6–33)
MCHC RBC AUTO-ENTMCNC: 34.7 G/DL (ref 31.5–35.7)
MCV RBC AUTO: 98.8 FL (ref 79–97)
MONOCYTES # BLD AUTO: 0.43 10*3/MM3 (ref 0.1–0.9)
MONOCYTES NFR BLD AUTO: 7 % (ref 5–12)
NEUTROPHILS NFR BLD AUTO: 3 10*3/MM3 (ref 1.7–7)
NEUTROPHILS NFR BLD AUTO: 48.6 % (ref 42.7–76)
NRBC BLD AUTO-RTO: 0 /100 WBC (ref 0–0.2)
PLATELET # BLD AUTO: 168 10*3/MM3 (ref 140–450)
PMV BLD AUTO: 10.1 FL (ref 6–12)
POTASSIUM SERPL-SCNC: 4.3 MMOL/L (ref 3.5–5.2)
PROT SERPL-MCNC: 6.7 G/DL (ref 6–8.5)
RBC # BLD AUTO: 4.29 10*6/MM3 (ref 3.77–5.28)
SODIUM SERPL-SCNC: 140 MMOL/L (ref 136–145)
WBC NRBC COR # BLD: 6.17 10*3/MM3 (ref 3.4–10.8)

## 2022-10-12 RX ORDER — FLUTICASONE PROPIONATE 50 MCG
SPRAY, SUSPENSION (ML) NASAL
Qty: 48 G | Refills: 1 | Status: SHIPPED | OUTPATIENT
Start: 2022-10-12

## 2022-10-29 VITALS
OXYGEN SATURATION: 98 % | SYSTOLIC BLOOD PRESSURE: 120 MMHG | HEART RATE: 80 BPM | DIASTOLIC BLOOD PRESSURE: 89 MMHG | HEIGHT: 71 IN | BODY MASS INDEX: 22.96 KG/M2 | WEIGHT: 164 LBS | TEMPERATURE: 98.4 F

## 2022-10-29 PROBLEM — R59.0 CERVICAL LYMPHADENOPATHY: Status: ACTIVE | Noted: 2022-10-29

## 2022-10-29 PROBLEM — T63.301A SPIDER BITE: Status: ACTIVE | Noted: 2022-10-29

## 2022-10-29 PROBLEM — J06.9 UPPER RESPIRATORY TRACT INFECTION: Status: ACTIVE | Noted: 2022-10-29

## 2023-11-26 ENCOUNTER — HOSPITAL ENCOUNTER (EMERGENCY)
Facility: HOSPITAL | Age: 43
Discharge: HOME | End: 2023-11-26
Attending: EMERGENCY MEDICINE | Admitting: EMERGENCY MEDICINE
Payer: COMMERCIAL

## 2023-11-26 ENCOUNTER — APPOINTMENT (OUTPATIENT)
Dept: CT IMAGING | Facility: HOSPITAL | Age: 43
End: 2023-11-26
Payer: COMMERCIAL

## 2023-11-26 VITALS
OXYGEN SATURATION: 99 % | HEART RATE: 109 BPM | WEIGHT: 160 LBS | TEMPERATURE: 98.5 F | HEIGHT: 71 IN | DIASTOLIC BLOOD PRESSURE: 95 MMHG | SYSTOLIC BLOOD PRESSURE: 119 MMHG | RESPIRATION RATE: 16 BRPM | BODY MASS INDEX: 22.4 KG/M2

## 2023-11-26 DIAGNOSIS — N30.90 CYSTITIS: Primary | ICD-10-CM

## 2023-11-26 LAB
B-HCG UR QL: NEGATIVE
BILIRUB UR QL STRIP: NEGATIVE
CLARITY UR: CLEAR
COLOR UR: YELLOW
GLUCOSE UR STRIP-MCNC: NEGATIVE MG/DL
HGB UR QL STRIP.AUTO: NEGATIVE
KETONES UR QL STRIP: NEGATIVE
LEUKOCYTE ESTERASE UR QL STRIP.AUTO: NEGATIVE
NITRITE UR QL STRIP: NEGATIVE
PH UR STRIP.AUTO: 6 [PH] (ref 5–8)
PROT UR QL STRIP: ABNORMAL
SP GR UR STRIP: >=1.03 (ref 1–1.03)
UROBILINOGEN UR QL STRIP: ABNORMAL

## 2023-11-26 PROCEDURE — 81003 URINALYSIS AUTO W/O SCOPE: CPT | Performed by: EMERGENCY MEDICINE

## 2023-11-26 PROCEDURE — 74176 CT ABD & PELVIS W/O CONTRAST: CPT

## 2023-11-26 PROCEDURE — 81025 URINE PREGNANCY TEST: CPT | Performed by: EMERGENCY MEDICINE

## 2023-11-26 PROCEDURE — 99284 EMERGENCY DEPT VISIT MOD MDM: CPT

## 2023-11-26 NOTE — FSED PROVIDER NOTE
Subjective   History of Present Illness  The patient 43-year-old female who presents to the ER with painful urination with low back pain and flank pain on both sides that started over a week ago.  Patient reports she was placed on Bactrim, and then patient went to [facility] clinic who changed her over to Cipro.  Patient reports she still having pressure and bilateral flank pain.  Patient denies any fevers, nausea or vomiting.    History provided by:  Patient   used: No        Review of Systems   Genitourinary:  Positive for dysuria, flank pain and frequency.       Past Medical History:   Diagnosis Date    Allergic     Anxiety     Depression     Headache     Irritable bowel syndrome     Miscarriage 03/2019       Allergies   Allergen Reactions    Codeine Irritability and Other (See Comments)     hyperactivity      Penicillins Hives       Past Surgical History:   Procedure Laterality Date    DILATATION AND CURETTAGE  03/2019    r/t miscarriage       Family History   Problem Relation Age of Onset    Hypertension Mother     Hypertension Father     Diabetes Father     Hyperlipidemia Father     Cancer Maternal Grandfather         colon       Social History     Socioeconomic History    Marital status: Single   Tobacco Use    Smoking status: Some Days     Packs/day: 0.25     Years: 10.00     Additional pack years: 0.00     Total pack years: 2.50     Types: Cigarettes    Smokeless tobacco: Never    Tobacco comments:     3-5 cigs per day   Vaping Use    Vaping Use: Never used   Substance and Sexual Activity    Alcohol use: Yes     Comment: 2 times per week    Drug use: Never    Sexual activity: Not Currently     Partners: Male     Comment: None           Objective   Physical Exam  Vitals and nursing note reviewed.   Constitutional:       Appearance: Normal appearance.   HENT:      Head: Normocephalic.      Right Ear: Tympanic membrane, ear canal and external ear normal.      Left Ear: Tympanic membrane, ear  canal and external ear normal.      Nose: Nose normal.      Mouth/Throat:      Lips: Pink.      Mouth: Mucous membranes are moist.      Pharynx: Oropharynx is clear. Uvula midline.   Eyes:      Extraocular Movements: Extraocular movements intact.      Conjunctiva/sclera: Conjunctivae normal.      Pupils: Pupils are equal, round, and reactive to light.   Cardiovascular:      Rate and Rhythm: Normal rate and regular rhythm.      Pulses: Normal pulses.      Heart sounds: Normal heart sounds.   Pulmonary:      Effort: Pulmonary effort is normal.      Breath sounds: Normal breath sounds.   Abdominal:      General: Bowel sounds are normal.      Palpations: Abdomen is soft.   Musculoskeletal:         General: Normal range of motion.      Cervical back: Normal range of motion and neck supple.   Skin:     General: Skin is warm and dry.   Neurological:      General: No focal deficit present.      Mental Status: She is alert and oriented to person, place, and time.   Psychiatric:         Mood and Affect: Mood normal.         Behavior: Behavior normal. Behavior is cooperative.         Procedures           ED Course  ED Course as of 11/26/23 1254   Sun Nov 26, 2023   1245 CT ABDOMEN PELVIS STONE PROTOCOL     Date of Exam: 11/26/2023 12:00 PM EST     Indication: Flank pain, kidney stone suspected.     Comparison: None available.     Technique: Axial CT images were obtained of the abdomen and pelvis without the administration of contrast. Sagittal and coronal reconstructions were performed.  Automated exposure control and iterative reconstruction methods were used.     Findings:  Lung bases are without consolidation. Heart size normal. Negative for pericardial or pleural effusion.     Lack of contrast limits assessment of abdominal organs and vasculature. Liver and spleen are normal in size and contour. Normal adrenal glands. Pancreas without findings of pancreatitis. Gallbladder present. No pericholecystic inflammation or  radiodense   gallstone.      The kidneys are symmetric in size. No renal or ureteral calculus. Negative for hydronephrosis or hydroureter. Mild urinary bladder wall thickening. Uterus and adnexa are without acute abnormality. Slight asymmetric enlargement the right ovary which may   relate to underlying ovarian cyst measuring 2 cm (2/110).     Negative for pneumoperitoneum. No bowel obstruction. Trace free fluid in the pelvis likely physiologic. Scattered colonic diverticulosis without diverticulitis. Normal appendix. No aggressive osseous lesion or acute fracture. Small area of sclerosis   measuring 6 mm at the right femoral head statistically likely bone island.     IMPRESSION:  Impression:  1. Mild urinary bladder wall thickening may relate to cystitis, correlate with urinalysis.  2. No renal or ureteral calculus.  3. Colonic diverticulosis without diverticulitis.  4. Additional chronic findings above.   [DS]   1246 Protein, UA(!): Trace [DS]      ED Course User Index  [DS] Mady Soares APRN                                           Medical Decision Making  The patient 43-year-old female who presents to the ER with painful urination with low back pain and flank pain on both sides that started over a week ago.  Patient reports she was placed on Bactrim, and then patient went to [facility] clinic who changed her over to Cipro.  Patient reports she still having pressure and bilateral flank pain.  Patient denies any fevers, nausea or vomiting.    This patient presents with symptoms consistent with acute uncomplicated cystitis. No systemic symptoms. Not septic. Well appearing. Low suspicion for acute pyelonephritis given lack of fever, CVAT, or systemic features. Low suspicion for kidney stone or infected stone. Upreg negative so doubt ectopic pregnancy. Low suspicion for ovarian torsion, PID, or appendicitis.    CT renal stone protocol is unremarkable at this time.    Patient advised to follow-up with primary  care for possible [facility], and for recheck of her urine.  Patient advised to also continue taking the antibiotics she was prescribed.  Will send her urine for culture as well.    Amount and/or Complexity of Data Reviewed  Labs:  Decision-making details documented in ED Course.  Radiology: ordered. Decision-making details documented in ED Course.    Risk  OTC drugs.        Final diagnoses:   Cystitis       ED Disposition  ED Disposition       ED Disposition   Discharge    Condition   Stable    Comment   --               Sharyn Hager MD  [address] [address] [phone]    Schedule an appointment as soon as possible for a visit in 1 week  For follow-up and recheck of your urine.    [facility] - [facility]  [address] [facility] [address] [phone]  Schedule an appointment as soon as possible for a visit in 1 week  Call and schedule follow-up appointment if you continue to have symptoms.  Make sure your insurance does not require specialty referral.         Medication List      No changes were made to your prescriptions during this visit.

## 2023-11-26 NOTE — DISCHARGE INSTRUCTIONS
Continue current antibiotic you are taking for urinary tract infection.     Follow-up with primary care for further evaluation and treatment.  Possible referral to [facility] if you continue to have symptoms.    Make sure you are drinking plenty of fluids    Tylenol/Motrin as needed for pain/fevers

## 2024-04-05 ENCOUNTER — HOSPITAL ENCOUNTER (OUTPATIENT)
Dept: GENERAL RADIOLOGY | Facility: HOSPITAL | Age: 44
Discharge: HOME | End: 2024-04-05
Payer: COMMERCIAL

## 2024-04-05 ENCOUNTER — LAB (OUTPATIENT)
Dept: LAB | Facility: HOSPITAL | Age: 44
End: 2024-04-05
Payer: COMMERCIAL

## 2024-04-05 ENCOUNTER — TRANSCRIBE ORDERS (OUTPATIENT)
Dept: ADMINISTRATIVE | Facility: HOSPITAL | Age: 44
End: 2024-04-05
Payer: COMMERCIAL

## 2024-04-05 DIAGNOSIS — J01.00 ACUTE MAXILLARY SINUSITIS, RECURRENCE NOT SPECIFIED: ICD-10-CM

## 2024-04-05 DIAGNOSIS — R06.03 ACUTE RESPIRATORY DISTRESS: ICD-10-CM

## 2024-04-05 DIAGNOSIS — R05.9 COUGH, UNSPECIFIED TYPE: ICD-10-CM

## 2024-04-05 DIAGNOSIS — R06.03 ACUTE RESPIRATORY DISTRESS: Primary | ICD-10-CM

## 2024-04-05 LAB
B PARAPERT DNA SPEC QL NAA+PROBE: NOT DETECTED
B PERT DNA SPEC QL NAA+PROBE: NOT DETECTED
BASOPHILS # BLD AUTO: 0.04 10*3/MM3 (ref 0–0.2)
BASOPHILS NFR BLD AUTO: 0.5 % (ref 0–1.5)
C PNEUM DNA NPH QL NAA+NON-PROBE: NOT DETECTED
CRP SERPL-MCNC: <0.3 MG/DL (ref 0–0.5)
DEPRECATED RDW RBC AUTO: 43.8 FL (ref 37–54)
EOSINOPHIL # BLD AUTO: 0.36 10*3/MM3 (ref 0–0.4)
EOSINOPHIL NFR BLD AUTO: 4.9 % (ref 0.3–6.2)
ERYTHROCYTE [DISTWIDTH] IN BLOOD BY AUTOMATED COUNT: 11.9 % (ref 12.3–15.4)
FLUAV SUBTYP SPEC NAA+PROBE: NOT DETECTED
FLUBV RNA ISLT QL NAA+PROBE: NOT DETECTED
HADV DNA SPEC NAA+PROBE: NOT DETECTED
HCOV 229E RNA SPEC QL NAA+PROBE: NOT DETECTED
HCOV HKU1 RNA SPEC QL NAA+PROBE: NOT DETECTED
HCOV NL63 RNA SPEC QL NAA+PROBE: DETECTED
HCOV OC43 RNA SPEC QL NAA+PROBE: NOT DETECTED
HCT VFR BLD AUTO: 44.1 % (ref 34–46.6)
HGB BLD-MCNC: 14.8 G/DL (ref 12–15.9)
HMPV RNA NPH QL NAA+NON-PROBE: NOT DETECTED
HPIV1 RNA ISLT QL NAA+PROBE: NOT DETECTED
HPIV2 RNA SPEC QL NAA+PROBE: NOT DETECTED
HPIV3 RNA NPH QL NAA+PROBE: NOT DETECTED
HPIV4 P GENE NPH QL NAA+PROBE: NOT DETECTED
IMM GRANULOCYTES # BLD AUTO: 0.04 10*3/MM3 (ref 0–0.05)
IMM GRANULOCYTES NFR BLD AUTO: 0.5 % (ref 0–0.5)
LYMPHOCYTES # BLD AUTO: 2.74 10*3/MM3 (ref 0.7–3.1)
LYMPHOCYTES NFR BLD AUTO: 36.9 % (ref 19.6–45.3)
M PNEUMO IGG SER IA-ACNC: NOT DETECTED
MCH RBC QN AUTO: 32.9 PG (ref 26.6–33)
MCHC RBC AUTO-ENTMCNC: 33.6 G/DL (ref 31.5–35.7)
MCV RBC AUTO: 98 FL (ref 79–97)
MONOCYTES # BLD AUTO: 0.49 10*3/MM3 (ref 0.1–0.9)
MONOCYTES NFR BLD AUTO: 6.6 % (ref 5–12)
NEUTROPHILS NFR BLD AUTO: 3.75 10*3/MM3 (ref 1.7–7)
NEUTROPHILS NFR BLD AUTO: 50.6 % (ref 42.7–76)
NRBC BLD AUTO-RTO: 0 /100 WBC (ref 0–0.2)
PLATELET # BLD AUTO: 222 10*3/MM3 (ref 140–450)
PMV BLD AUTO: 8.9 FL (ref 6–12)
RBC # BLD AUTO: 4.5 10*6/MM3 (ref 3.77–5.28)
RHINOVIRUS RNA SPEC NAA+PROBE: NOT DETECTED
RSV RNA NPH QL NAA+NON-PROBE: NOT DETECTED
SARS-COV-2 RNA NPH QL NAA+NON-PROBE: NOT DETECTED
WBC NRBC COR # BLD AUTO: 7.42 10*3/MM3 (ref 3.4–10.8)

## 2024-04-05 PROCEDURE — 71046 X-RAY EXAM CHEST 2 VIEWS: CPT

## 2024-04-05 PROCEDURE — 0202U NFCT DS 22 TRGT SARS-COV-2: CPT

## 2024-04-05 PROCEDURE — 86140 C-REACTIVE PROTEIN: CPT

## 2024-04-05 PROCEDURE — 36415 COLL VENOUS BLD VENIPUNCTURE: CPT

## 2024-04-05 PROCEDURE — 85025 COMPLETE CBC W/AUTO DIFF WBC: CPT

## 2024-10-16 ENCOUNTER — TRANSCRIBE ORDERS (OUTPATIENT)
Dept: ADMINISTRATIVE | Facility: HOSPITAL | Age: 44
End: 2024-10-16
Payer: COMMERCIAL

## 2024-10-16 ENCOUNTER — LAB (OUTPATIENT)
Dept: LAB | Facility: HOSPITAL | Age: 44
End: 2024-10-16
Payer: COMMERCIAL

## 2024-10-16 DIAGNOSIS — M25.531 BILATERAL WRIST PAIN: ICD-10-CM

## 2024-10-16 DIAGNOSIS — M25.532 BILATERAL WRIST PAIN: ICD-10-CM

## 2024-10-16 DIAGNOSIS — M25.522 BILATERAL ELBOW JOINT PAIN: ICD-10-CM

## 2024-10-16 DIAGNOSIS — M79.641 BILATERAL HAND PAIN: ICD-10-CM

## 2024-10-16 DIAGNOSIS — M79.642 BILATERAL HAND PAIN: ICD-10-CM

## 2024-10-16 DIAGNOSIS — M79.642 BILATERAL HAND PAIN: Primary | ICD-10-CM

## 2024-10-16 DIAGNOSIS — M25.521 BILATERAL ELBOW JOINT PAIN: ICD-10-CM

## 2024-10-16 DIAGNOSIS — M79.641 BILATERAL HAND PAIN: Primary | ICD-10-CM

## 2024-10-16 LAB
ALBUMIN SERPL-MCNC: 4.7 G/DL (ref 3.5–5.2)
ALBUMIN/GLOB SERPL: 1.7 G/DL
ALP SERPL-CCNC: 46 U/L (ref 39–117)
ALT SERPL W P-5'-P-CCNC: 10 U/L (ref 1–33)
ANION GAP SERPL CALCULATED.3IONS-SCNC: 11 MMOL/L (ref 5–15)
AST SERPL-CCNC: 17 U/L (ref 1–32)
BASOPHILS # BLD AUTO: 0.04 10*3/MM3 (ref 0–0.2)
BASOPHILS NFR BLD AUTO: 0.6 % (ref 0–1.5)
BILIRUB SERPL-MCNC: 0.4 MG/DL (ref 0–1.2)
BUN SERPL-MCNC: 11 MG/DL (ref 6–20)
BUN/CREAT SERPL: 11.7 (ref 7–25)
CALCIUM SPEC-SCNC: 9.4 MG/DL (ref 8.6–10.5)
CHLORIDE SERPL-SCNC: 105 MMOL/L (ref 98–107)
CHROMATIN AB SERPL-ACNC: 10.5 IU/ML (ref 0–14)
CK SERPL-CCNC: 66 U/L (ref 20–180)
CO2 SERPL-SCNC: 22 MMOL/L (ref 22–29)
CREAT SERPL-MCNC: 0.94 MG/DL (ref 0.57–1)
CRP SERPL-MCNC: <0.3 MG/DL (ref 0–0.5)
DEPRECATED RDW RBC AUTO: 43.9 FL (ref 37–54)
EGFRCR SERPLBLD CKD-EPI 2021: 77.4 ML/MIN/1.73
EOSINOPHIL # BLD AUTO: 0.62 10*3/MM3 (ref 0–0.4)
EOSINOPHIL NFR BLD AUTO: 9.3 % (ref 0.3–6.2)
ERYTHROCYTE [DISTWIDTH] IN BLOOD BY AUTOMATED COUNT: 11.8 % (ref 12.3–15.4)
ERYTHROCYTE [SEDIMENTATION RATE] IN BLOOD: 5 MM/HR (ref 0–20)
GLOBULIN UR ELPH-MCNC: 2.7 GM/DL
GLUCOSE SERPL-MCNC: 88 MG/DL (ref 65–99)
HCT VFR BLD AUTO: 47.1 % (ref 34–46.6)
HGB BLD-MCNC: 15.4 G/DL (ref 12–15.9)
IMM GRANULOCYTES # BLD AUTO: 0.01 10*3/MM3 (ref 0–0.05)
IMM GRANULOCYTES NFR BLD AUTO: 0.1 % (ref 0–0.5)
LYMPHOCYTES # BLD AUTO: 1.99 10*3/MM3 (ref 0.7–3.1)
LYMPHOCYTES NFR BLD AUTO: 29.7 % (ref 19.6–45.3)
MCH RBC QN AUTO: 33 PG (ref 26.6–33)
MCHC RBC AUTO-ENTMCNC: 32.7 G/DL (ref 31.5–35.7)
MCV RBC AUTO: 101.1 FL (ref 79–97)
MONOCYTES # BLD AUTO: 0.43 10*3/MM3 (ref 0.1–0.9)
MONOCYTES NFR BLD AUTO: 6.4 % (ref 5–12)
NEUTROPHILS NFR BLD AUTO: 3.61 10*3/MM3 (ref 1.7–7)
NEUTROPHILS NFR BLD AUTO: 53.9 % (ref 42.7–76)
NRBC BLD AUTO-RTO: 0 /100 WBC (ref 0–0.2)
PLATELET # BLD AUTO: 235 10*3/MM3 (ref 140–450)
PMV BLD AUTO: 9.7 FL (ref 6–12)
POTASSIUM SERPL-SCNC: 4.3 MMOL/L (ref 3.5–5.2)
PROT SERPL-MCNC: 7.4 G/DL (ref 6–8.5)
RBC # BLD AUTO: 4.66 10*6/MM3 (ref 3.77–5.28)
SODIUM SERPL-SCNC: 138 MMOL/L (ref 136–145)
URATE SERPL-MCNC: 3.9 MG/DL (ref 2.4–5.7)
WBC NRBC COR # BLD AUTO: 6.7 10*3/MM3 (ref 3.4–10.8)

## 2024-10-16 PROCEDURE — 36415 COLL VENOUS BLD VENIPUNCTURE: CPT

## 2024-10-16 PROCEDURE — 84550 ASSAY OF BLOOD/URIC ACID: CPT

## 2024-10-16 PROCEDURE — 86200 CCP ANTIBODY: CPT

## 2024-10-16 PROCEDURE — 86225 DNA ANTIBODY NATIVE: CPT

## 2024-10-16 PROCEDURE — 85652 RBC SED RATE AUTOMATED: CPT

## 2024-10-16 PROCEDURE — 82550 ASSAY OF CK (CPK): CPT

## 2024-10-16 PROCEDURE — 86140 C-REACTIVE PROTEIN: CPT

## 2024-10-16 PROCEDURE — 81374 HLA I TYPING 1 ANTIGEN LR: CPT

## 2024-10-16 PROCEDURE — 80053 COMPREHEN METABOLIC PANEL: CPT

## 2024-10-16 PROCEDURE — 85025 COMPLETE CBC W/AUTO DIFF WBC: CPT

## 2024-10-16 PROCEDURE — 86038 ANTINUCLEAR ANTIBODIES: CPT

## 2024-10-16 PROCEDURE — 86431 RHEUMATOID FACTOR QUANT: CPT

## 2024-10-17 LAB
CCP IGA+IGG SERPL IA-ACNC: 7 UNITS (ref 0–19)
DSDNA AB SER-ACNC: 2 IU/ML (ref 0–9)

## 2024-10-18 LAB — ANA SER QL: NEGATIVE

## 2024-10-23 LAB — HLA-B27 QL NAA+PROBE: NEGATIVE

## 2025-02-13 ENCOUNTER — APPOINTMENT (OUTPATIENT)
Dept: CT IMAGING | Facility: HOSPITAL | Age: 45
End: 2025-02-13
Payer: COMMERCIAL

## 2025-02-13 ENCOUNTER — HOSPITAL ENCOUNTER (EMERGENCY)
Facility: HOSPITAL | Age: 45
Discharge: HOME | End: 2025-02-13
Attending: EMERGENCY MEDICINE
Payer: COMMERCIAL

## 2025-02-13 VITALS
OXYGEN SATURATION: 96 % | BODY MASS INDEX: 24.42 KG/M2 | TEMPERATURE: 97.5 F | RESPIRATION RATE: 16 BRPM | HEIGHT: 70 IN | SYSTOLIC BLOOD PRESSURE: 124 MMHG | WEIGHT: 170.6 LBS | HEART RATE: 79 BPM | DIASTOLIC BLOOD PRESSURE: 92 MMHG

## 2025-02-13 DIAGNOSIS — K57.92 ACUTE DIVERTICULITIS: Primary | ICD-10-CM

## 2025-02-13 LAB
ALBUMIN SERPL-MCNC: 4.3 G/DL (ref 3.5–5.2)
ALBUMIN/GLOB SERPL: 1.4 G/DL
ALP SERPL-CCNC: 44 U/L (ref 39–117)
ALT SERPL W P-5'-P-CCNC: 8 U/L (ref 1–33)
ANION GAP SERPL CALCULATED.3IONS-SCNC: 8.9 MMOL/L (ref 5–15)
AST SERPL-CCNC: 10 U/L (ref 1–32)
BACTERIA UR QL AUTO: NORMAL /HPF
BASOPHILS # BLD AUTO: 0.03 10*3/MM3 (ref 0–0.2)
BASOPHILS NFR BLD AUTO: 0.6 % (ref 0–1.5)
BILIRUB SERPL-MCNC: 0.4 MG/DL (ref 0–1.2)
BILIRUB UR QL STRIP: NEGATIVE
BUN SERPL-MCNC: 8 MG/DL (ref 6–20)
BUN/CREAT SERPL: 8.2 (ref 7–25)
CALCIUM SPEC-SCNC: 9.3 MG/DL (ref 8.6–10.5)
CHLORIDE SERPL-SCNC: 107 MMOL/L (ref 98–107)
CLARITY UR: CLEAR
CO2 SERPL-SCNC: 19.1 MMOL/L (ref 22–29)
COLOR UR: YELLOW
CREAT SERPL-MCNC: 0.97 MG/DL (ref 0.57–1)
DEPRECATED RDW RBC AUTO: 43 FL (ref 37–54)
EGFRCR SERPLBLD CKD-EPI 2021: 74.5 ML/MIN/1.73
EOSINOPHIL # BLD AUTO: 0.32 10*3/MM3 (ref 0–0.4)
EOSINOPHIL NFR BLD AUTO: 6.2 % (ref 0.3–6.2)
ERYTHROCYTE [DISTWIDTH] IN BLOOD BY AUTOMATED COUNT: 11.7 % (ref 12.3–15.4)
GLOBULIN UR ELPH-MCNC: 3 GM/DL
GLUCOSE SERPL-MCNC: 93 MG/DL (ref 65–99)
GLUCOSE UR STRIP-MCNC: NEGATIVE MG/DL
HCT VFR BLD AUTO: 43.1 % (ref 34–46.6)
HGB BLD-MCNC: 14.3 G/DL (ref 12–15.9)
HGB UR QL STRIP.AUTO: ABNORMAL
HYALINE CASTS UR QL AUTO: NORMAL /LPF
IMM GRANULOCYTES # BLD AUTO: 0.01 10*3/MM3 (ref 0–0.05)
IMM GRANULOCYTES NFR BLD AUTO: 0.2 % (ref 0–0.5)
KETONES UR QL STRIP: NEGATIVE
LEUKOCYTE ESTERASE UR QL STRIP.AUTO: NEGATIVE
LIPASE SERPL-CCNC: 23 U/L (ref 13–60)
LYMPHOCYTES # BLD AUTO: 1.44 10*3/MM3 (ref 0.7–3.1)
LYMPHOCYTES NFR BLD AUTO: 27.8 % (ref 19.6–45.3)
MCH RBC QN AUTO: 32.6 PG (ref 26.6–33)
MCHC RBC AUTO-ENTMCNC: 33.2 G/DL (ref 31.5–35.7)
MCV RBC AUTO: 98.2 FL (ref 79–97)
MONOCYTES # BLD AUTO: 0.28 10*3/MM3 (ref 0.1–0.9)
MONOCYTES NFR BLD AUTO: 5.4 % (ref 5–12)
NEUTROPHILS NFR BLD AUTO: 3.1 10*3/MM3 (ref 1.7–7)
NEUTROPHILS NFR BLD AUTO: 59.8 % (ref 42.7–76)
NITRITE UR QL STRIP: NEGATIVE
PH UR STRIP.AUTO: 6 [PH] (ref 5–8)
PLATELET # BLD AUTO: 202 10*3/MM3 (ref 140–450)
PMV BLD AUTO: 9 FL (ref 6–12)
POTASSIUM SERPL-SCNC: 3.8 MMOL/L (ref 3.5–5.2)
PROT SERPL-MCNC: 7.3 G/DL (ref 6–8.5)
PROT UR QL STRIP: NEGATIVE
RBC # BLD AUTO: 4.39 10*6/MM3 (ref 3.77–5.28)
RBC # UR STRIP: NORMAL /HPF
REF LAB TEST METHOD: NORMAL
SODIUM SERPL-SCNC: 135 MMOL/L (ref 136–145)
SP GR UR STRIP: <=1.005 (ref 1–1.03)
SQUAMOUS #/AREA URNS HPF: NORMAL /HPF
UROBILINOGEN UR QL STRIP: ABNORMAL
WBC # UR STRIP: NORMAL /HPF
WBC NRBC COR # BLD AUTO: 5.18 10*3/MM3 (ref 3.4–10.8)

## 2025-02-13 PROCEDURE — 99284 EMERGENCY DEPT VISIT MOD MDM: CPT | Performed by: EMERGENCY MEDICINE

## 2025-02-13 PROCEDURE — 85025 COMPLETE CBC W/AUTO DIFF WBC: CPT | Performed by: EMERGENCY MEDICINE

## 2025-02-13 PROCEDURE — 83690 ASSAY OF LIPASE: CPT | Performed by: EMERGENCY MEDICINE

## 2025-02-13 PROCEDURE — 81001 URINALYSIS AUTO W/SCOPE: CPT | Performed by: EMERGENCY MEDICINE

## 2025-02-13 PROCEDURE — 74176 CT ABD & PELVIS W/O CONTRAST: CPT

## 2025-02-13 PROCEDURE — 80053 COMPREHEN METABOLIC PANEL: CPT | Performed by: EMERGENCY MEDICINE

## 2025-02-13 PROCEDURE — 99284 EMERGENCY DEPT VISIT MOD MDM: CPT

## 2025-02-13 RX ORDER — CIPROFLOXACIN 500 MG/1
500 TABLET, FILM COATED ORAL 2 TIMES DAILY
Qty: 14 TABLET | Refills: 0 | Status: SHIPPED | OUTPATIENT
Start: 2025-02-13 | End: 2025-02-20

## 2025-02-13 RX ORDER — SODIUM CHLORIDE 0.9 % (FLUSH) 0.9 %
10 SYRINGE (ML) INJECTION AS NEEDED
Status: DISCONTINUED | OUTPATIENT
Start: 2025-02-13 | End: 2025-02-13 | Stop reason: HOSPADM

## 2025-02-13 RX ORDER — METRONIDAZOLE 500 MG/1
500 TABLET ORAL 2 TIMES DAILY
Qty: 14 TABLET | Refills: 0 | Status: SHIPPED | OUTPATIENT
Start: 2025-02-13 | End: 2025-02-20

## 2025-02-13 RX ORDER — TRAMADOL HYDROCHLORIDE 50 MG/1
50 TABLET ORAL EVERY 6 HOURS PRN
Qty: 15 TABLET | Refills: 0 | Status: SHIPPED | OUTPATIENT
Start: 2025-02-13

## 2025-02-13 NOTE — FSED PROVIDER NOTE
Subjective   History of Present Illness  44-year-old female with past medical history of IBS presents with 5-day history of lower abdominal pain.  Patient states she began feeling symptoms this past Sunday and thought she was constipated so took laxatives.  States she had diarrhea on Monday and pain has worsened since then.  No fevers or chills states intermittent nausea.  No further diarrhea.  States decreased p.o. intake over the past few days, with urinary frequency and feeling of lower abdominal pressure with urinary frequency.  Denies vaginal discharge, states monogamous relationship without intercourse in the last few weeks.  Nothing taken for pain.  No other acute somatic complaints at this time.    History provided by:  Patient      Review of Systems   All other systems reviewed and are negative.      Past Medical History:   Diagnosis Date    Allergic     Anxiety     Depression     Headache     Irritable bowel syndrome     Miscarriage 03/2019       Allergies   Allergen Reactions    Codeine Irritability and Other (See Comments)     hyperactivity      Penicillins Hives       Past Surgical History:   Procedure Laterality Date    DILATATION AND CURETTAGE  03/2019    r/t miscarriage       Family History   Problem Relation Age of Onset    Hypertension Mother     Hypertension Father     Diabetes Father     Hyperlipidemia Father     Cancer Maternal Grandfather         colon       Social History     Socioeconomic History    Marital status: Single   Tobacco Use    Smoking status: Some Days     Current packs/day: 0.25     Average packs/day: 0.3 packs/day for 10.0 years (2.5 ttl pk-yrs)     Types: Cigarettes    Smokeless tobacco: Never    Tobacco comments:     3-5 cigs per day   Vaping Use    Vaping status: Never Used   Substance and Sexual Activity    Alcohol use: Yes     Comment: 2 times per week    Drug use: Never    Sexual activity: Not Currently     Partners: Male     Comment: None           Objective   Physical  Exam  Vitals and nursing note reviewed.   Constitutional:       Appearance: Normal appearance.   HENT:      Head: Normocephalic and atraumatic.      Nose: Nose normal.      Mouth/Throat:      Mouth: Mucous membranes are moist.      Pharynx: Oropharynx is clear.   Eyes:      Extraocular Movements: Extraocular movements intact.   Cardiovascular:      Rate and Rhythm: Normal rate and regular rhythm.      Heart sounds: Normal heart sounds.   Pulmonary:      Effort: Pulmonary effort is normal.      Breath sounds: Normal breath sounds.   Abdominal:      General: Bowel sounds are normal.      Palpations: Abdomen is soft.      Tenderness: There is abdominal tenderness in the right lower quadrant, suprapubic area and left lower quadrant. There is no guarding or rebound.   Genitourinary:     Comments: Deferred  Musculoskeletal:         General: Normal range of motion.      Cervical back: Normal range of motion and neck supple.   Skin:     General: Skin is warm and dry.   Neurological:      General: No focal deficit present.      Mental Status: She is alert and oriented to person, place, and time.   Psychiatric:         Mood and Affect: Mood normal.         Behavior: Behavior normal.         Procedures           ED Course  Results for orders placed or performed during the hospital encounter of 02/13/25   Urinalysis With Microscopic If Indicated (No Culture) - Urine, Clean Catch    Collection Time: 02/13/25  9:59 AM    Specimen: Urine, Clean Catch   Result Value Ref Range    Color, UA Yellow Yellow, Straw    Appearance, UA Clear Clear    pH, UA 6.0 5.0 - 8.0    Specific Gravity, UA <=1.005 1.005 - 1.030    Glucose, UA Negative Negative    Ketones, UA Negative Negative    Bilirubin, UA Negative Negative    Blood, UA Trace (A) Negative    Protein, UA Negative Negative    Leuk Esterase, UA Negative Negative    Nitrite, UA Negative Negative    Urobilinogen, UA 0.2 E.U./dL 0.2 - 1.0 E.U./dL   Comprehensive Metabolic Panel     Collection Time: 02/13/25 10:37 AM    Specimen: Blood   Result Value Ref Range    Glucose 93 65 - 99 mg/dL    BUN 8 6 - 20 mg/dL    Creatinine 0.97 0.57 - 1.00 mg/dL    Sodium 135 (L) 136 - 145 mmol/L    Potassium 3.8 3.5 - 5.2 mmol/L    Chloride 107 98 - 107 mmol/L    CO2 19.1 (L) 22.0 - 29.0 mmol/L    Calcium 9.3 8.6 - 10.5 mg/dL    Total Protein 7.3 6.0 - 8.5 g/dL    Albumin 4.3 3.5 - 5.2 g/dL    ALT (SGPT) 8 1 - 33 U/L    AST (SGOT) 10 1 - 32 U/L    Alkaline Phosphatase 44 39 - 117 U/L    Total Bilirubin 0.4 0.0 - 1.2 mg/dL    Globulin 3.0 gm/dL    A/G Ratio 1.4 g/dL    BUN/Creatinine Ratio 8.2 7.0 - 25.0    Anion Gap 8.9 5.0 - 15.0 mmol/L    eGFR 74.5 >60.0 mL/min/1.73   Lipase    Collection Time: 02/13/25 10:37 AM    Specimen: Blood   Result Value Ref Range    Lipase 23 13 - 60 U/L   CBC Auto Differential    Collection Time: 02/13/25 10:37 AM    Specimen: Blood   Result Value Ref Range    WBC 5.18 3.40 - 10.80 10*3/mm3    RBC 4.39 3.77 - 5.28 10*6/mm3    Hemoglobin 14.3 12.0 - 15.9 g/dL    Hematocrit 43.1 34.0 - 46.6 %    MCV 98.2 (H) 79.0 - 97.0 fL    MCH 32.6 26.6 - 33.0 pg    MCHC 33.2 31.5 - 35.7 g/dL    RDW 11.7 (L) 12.3 - 15.4 %    RDW-SD 43.0 37.0 - 54.0 fl    MPV 9.0 6.0 - 12.0 fL    Platelets 202 140 - 450 10*3/mm3    Neutrophil % 59.8 42.7 - 76.0 %    Lymphocyte % 27.8 19.6 - 45.3 %    Monocyte % 5.4 5.0 - 12.0 %    Eosinophil % 6.2 0.3 - 6.2 %    Basophil % 0.6 0.0 - 1.5 %    Immature Grans % 0.2 0.0 - 0.5 %    Neutrophils, Absolute 3.10 1.70 - 7.00 10*3/mm3    Lymphocytes, Absolute 1.44 0.70 - 3.10 10*3/mm3    Monocytes, Absolute 0.28 0.10 - 0.90 10*3/mm3    Eosinophils, Absolute 0.32 0.00 - 0.40 10*3/mm3    Basophils, Absolute 0.03 0.00 - 0.20 10*3/mm3    Immature Grans, Absolute 0.01 0.00 - 0.05 10*3/mm3      CT Abdomen Pelvis Without Contrast   Final Result   Impression:   1. Sigmoid diverticulitis. No abscess.   2. No renal or ureteral calculus.                        Electronically Signed:  Igor Ames MD     2/13/2025 11:20 AM EST     Workstation ID: [id number]         Medications   sodium chloride 0.9 % flush 10 mL (has no administration in time range)              Medical Decision Making  Multiple differential diagnoses were considered including but not limited to UTI, ureterolithiasis, IBS, less likely PID, endometriosis, I doubt ruptured diverticula or mesenteric ischemia.     Patient is nontoxic afebrile, tolerating p.o. and in no distress.  Patient is stable to follow-up with outpatient primary care physician after trial of p.o Cipro, Flagyl, tramadol and probiotics      Amount and/or Complexity of Data Reviewed  Labs: ordered. Decision-making details documented in ED Course.  Radiology: ordered. Decision-making details documented in ED Course.    Risk  OTC drugs.  Prescription drug management.        Final diagnoses:   Acute diverticulitis       ED Disposition  ED Disposition       ED Disposition   Discharge    Condition   Stable    Comment   --               Sharyn Hager MD  [address] [facility] [address]  [place] [address] [phone]    Schedule an appointment as soon as possible for a visit in 2 days           Medication List        New Prescriptions      ciprofloxacin 500 MG tablet  Commonly known as: CIPRO  Take 1 tablet by mouth 2 (Two) Times a Day for 7 days.     lactobacillus tablet caplet  Take 1 tablet by mouth 2 (Two) Times a Day for 7 days.     metroNIDAZOLE 500 MG tablet  Commonly known as: FLAGYL  Take 1 tablet by mouth 2 (Two) Times a Day for 7 days.     traMADol 50 MG tablet  Commonly known as: ULTRAM  Take 1 tablet by mouth Every 6 (Six) Hours As Needed for Moderate Pain.               Where to Get Your Medications        These medications were sent to [id number] - [place], [place] - [address] [facility] AT SEC [address] [facility] [address] - [phone]  - [phone]   [address] [facility] [facility] [place] [address]      Phone: [phone]   ciprofloxacin 500 MG  tablet  lactobacillus tablet caplet  metroNIDAZOLE 500 MG tablet  traMADol 50 MG tablet

## 2025-02-13 NOTE — Clinical Note
[facility] FSED [place] [id number] [place] [address] [place] [address]  Phone: [phone]    Sasha Ye was seen and treated in our emergency department on 2/13/2025.  She may return to work on 02/17/2025.         Thank you for choosing [facility].    Briseida Kraft MD

## 2025-03-01 ENCOUNTER — APPOINTMENT (OUTPATIENT)
Dept: CT IMAGING | Facility: HOSPITAL | Age: 45
End: 2025-03-01
Payer: COMMERCIAL

## 2025-03-01 ENCOUNTER — HOSPITAL ENCOUNTER (EMERGENCY)
Facility: HOSPITAL | Age: 45
Discharge: HOME | End: 2025-03-01
Attending: EMERGENCY MEDICINE
Payer: COMMERCIAL

## 2025-03-01 VITALS
DIASTOLIC BLOOD PRESSURE: 81 MMHG | HEIGHT: 70 IN | SYSTOLIC BLOOD PRESSURE: 108 MMHG | OXYGEN SATURATION: 98 % | TEMPERATURE: 97.5 F | WEIGHT: 165.34 LBS | RESPIRATION RATE: 18 BRPM | HEART RATE: 99 BPM | BODY MASS INDEX: 23.67 KG/M2

## 2025-03-01 DIAGNOSIS — F10.929 ALCOHOLIC INTOXICATION WITH COMPLICATION: ICD-10-CM

## 2025-03-01 DIAGNOSIS — E86.0 DEHYDRATION: ICD-10-CM

## 2025-03-01 DIAGNOSIS — S09.90XA INJURY OF HEAD, INITIAL ENCOUNTER: Primary | ICD-10-CM

## 2025-03-01 DIAGNOSIS — S01.312A LACERATION OF LEFT EAR, INITIAL ENCOUNTER: ICD-10-CM

## 2025-03-01 LAB
ACETONE BLD QL: NEGATIVE
ALBUMIN SERPL-MCNC: 4.7 G/DL (ref 3.5–5.2)
ALBUMIN/GLOB SERPL: 1.7 G/DL
ALP SERPL-CCNC: 41 U/L (ref 39–117)
ALT SERPL W P-5'-P-CCNC: 11 U/L (ref 1–33)
ANION GAP SERPL CALCULATED.3IONS-SCNC: 18.4 MMOL/L (ref 5–15)
AST SERPL-CCNC: 22 U/L (ref 1–32)
ATMOSPHERIC PRESS: ABNORMAL MM[HG]
BACTERIA UR QL AUTO: ABNORMAL /HPF
BASE EXCESS BLDV CALC-SCNC: -11.3 MMOL/L (ref -2–2)
BASOPHILS # BLD AUTO: 0.06 10*3/MM3 (ref 0–0.2)
BASOPHILS NFR BLD AUTO: 0.8 % (ref 0–1.5)
BILIRUB SERPL-MCNC: 0.2 MG/DL (ref 0–1.2)
BILIRUB UR QL STRIP: NEGATIVE
BUN SERPL-MCNC: 6 MG/DL (ref 6–20)
BUN/CREAT SERPL: 5.8 (ref 7–25)
CALCIUM SPEC-SCNC: 8.9 MG/DL (ref 8.6–10.5)
CHLORIDE SERPL-SCNC: 99 MMOL/L (ref 98–107)
CK SERPL-CCNC: 278 U/L (ref 20–180)
CLARITY UR: CLEAR
CO2 BLDA-SCNC: 14 MMOL/L (ref 22–29)
CO2 SERPL-SCNC: 13.6 MMOL/L (ref 22–29)
COLOR UR: YELLOW
CREAT SERPL-MCNC: 1.04 MG/DL (ref 0.57–1)
DEPRECATED RDW RBC AUTO: 41.4 FL (ref 37–54)
EGFRCR SERPLBLD CKD-EPI 2021: 68.5 ML/MIN/1.73
EOSINOPHIL # BLD AUTO: 0.31 10*3/MM3 (ref 0–0.4)
EOSINOPHIL NFR BLD AUTO: 4 % (ref 0.3–6.2)
ERYTHROCYTE [DISTWIDTH] IN BLOOD BY AUTOMATED COUNT: 11.5 % (ref 12.3–15.4)
ETHANOL UR QL: 0.29 %
GLOBULIN UR ELPH-MCNC: 2.7 GM/DL
GLUCOSE SERPL-MCNC: 101 MG/DL (ref 65–99)
GLUCOSE UR STRIP-MCNC: NEGATIVE MG/DL
HCO3 BLDV-SCNC: 13.2 MMOL/L (ref 22–26)
HCT VFR BLD AUTO: 45.7 % (ref 34–46.6)
HGB BLD-MCNC: 15.3 G/DL (ref 12–15.9)
HGB UR QL STRIP.AUTO: ABNORMAL
HYALINE CASTS UR QL AUTO: ABNORMAL /LPF
IMM GRANULOCYTES # BLD AUTO: 0.03 10*3/MM3 (ref 0–0.05)
IMM GRANULOCYTES NFR BLD AUTO: 0.4 % (ref 0–0.5)
INHALED O2 CONCENTRATION: 21 %
KETONES UR QL STRIP: NEGATIVE
LEUKOCYTE ESTERASE UR QL STRIP.AUTO: NEGATIVE
LYMPHOCYTES # BLD AUTO: 1.32 10*3/MM3 (ref 0.7–3.1)
LYMPHOCYTES NFR BLD AUTO: 17.1 % (ref 19.6–45.3)
MAGNESIUM SERPL-MCNC: 2.2 MG/DL (ref 1.6–2.6)
MCH RBC QN AUTO: 32.8 PG (ref 26.6–33)
MCHC RBC AUTO-ENTMCNC: 33.5 G/DL (ref 31.5–35.7)
MCV RBC AUTO: 97.9 FL (ref 79–97)
MODALITY: ABNORMAL
MONOCYTES # BLD AUTO: 0.34 10*3/MM3 (ref 0.1–0.9)
MONOCYTES NFR BLD AUTO: 4.4 % (ref 5–12)
NEUTROPHILS NFR BLD AUTO: 5.68 10*3/MM3 (ref 1.7–7)
NEUTROPHILS NFR BLD AUTO: 73.3 % (ref 42.7–76)
NITRITE UR QL STRIP: NEGATIVE
NRBC BLD AUTO-RTO: 0 /100 WBC (ref 0–0.2)
PCO2 BLDV: 25.6 MM HG (ref 42–51)
PH BLDV: 7.32 PH UNITS (ref 7.32–7.43)
PH UR STRIP.AUTO: 5.5 [PH] (ref 5–8)
PLATELET # BLD AUTO: 286 10*3/MM3 (ref 140–450)
PMV BLD AUTO: 9.4 FL (ref 6–12)
PO2 BLDV: 102.5 MM HG (ref 40–42)
POTASSIUM SERPL-SCNC: 3.7 MMOL/L (ref 3.5–5.2)
PROT SERPL-MCNC: 7.4 G/DL (ref 6–8.5)
PROT UR QL STRIP: NEGATIVE
RBC # BLD AUTO: 4.67 10*6/MM3 (ref 3.77–5.28)
RBC # UR STRIP: ABNORMAL /HPF
REF LAB TEST METHOD: ABNORMAL
SAO2 % BLDCOV: 97.6 % (ref 45–75)
SODIUM SERPL-SCNC: 131 MMOL/L (ref 136–145)
SP GR UR STRIP: <=1.005 (ref 1–1.03)
SQUAMOUS #/AREA URNS HPF: ABNORMAL /HPF
UROBILINOGEN UR QL STRIP: ABNORMAL
WBC # UR STRIP: ABNORMAL /HPF
WBC NRBC COR # BLD AUTO: 7.74 10*3/MM3 (ref 3.4–10.8)

## 2025-03-01 PROCEDURE — 96365 THER/PROPH/DIAG IV INF INIT: CPT

## 2025-03-01 PROCEDURE — 85025 COMPLETE CBC W/AUTO DIFF WBC: CPT | Performed by: EMERGENCY MEDICINE

## 2025-03-01 PROCEDURE — 36415 COLL VENOUS BLD VENIPUNCTURE: CPT

## 2025-03-01 PROCEDURE — 81001 URINALYSIS AUTO W/SCOPE: CPT | Performed by: EMERGENCY MEDICINE

## 2025-03-01 PROCEDURE — 25010000002 THIAMINE PER 100 MG: Performed by: EMERGENCY MEDICINE

## 2025-03-01 PROCEDURE — 82077 ASSAY SPEC XCP UR&BREATH IA: CPT | Performed by: EMERGENCY MEDICINE

## 2025-03-01 PROCEDURE — 70450 CT HEAD/BRAIN W/O DYE: CPT

## 2025-03-01 PROCEDURE — 80053 COMPREHEN METABOLIC PANEL: CPT | Performed by: EMERGENCY MEDICINE

## 2025-03-01 PROCEDURE — 82009 KETONE BODYS QUAL: CPT | Performed by: EMERGENCY MEDICINE

## 2025-03-01 PROCEDURE — 25810000003 SODIUM CHLORIDE 0.9 % SOLUTION 1,000 ML FLEX CONT: Performed by: EMERGENCY MEDICINE

## 2025-03-01 PROCEDURE — 82803 BLOOD GASES ANY COMBINATION: CPT | Performed by: EMERGENCY MEDICINE

## 2025-03-01 PROCEDURE — 83735 ASSAY OF MAGNESIUM: CPT | Performed by: EMERGENCY MEDICINE

## 2025-03-01 PROCEDURE — 82550 ASSAY OF CK (CPK): CPT | Performed by: EMERGENCY MEDICINE

## 2025-03-01 PROCEDURE — 99284 EMERGENCY DEPT VISIT MOD MDM: CPT

## 2025-03-01 RX ORDER — DIAPER,BRIEF,INFANT-TODD,DISP
1 EACH MISCELLANEOUS ONCE
Status: COMPLETED | OUTPATIENT
Start: 2025-03-01 | End: 2025-03-01

## 2025-03-01 RX ORDER — LIDOCAINE HYDROCHLORIDE 10 MG/ML
10 INJECTION, SOLUTION INFILTRATION; PERINEURAL ONCE
Status: DISCONTINUED | OUTPATIENT
Start: 2025-03-01 | End: 2025-03-01 | Stop reason: HOSPADM

## 2025-03-01 RX ORDER — CLINDAMYCIN HYDROCHLORIDE 300 MG/1
300 CAPSULE ORAL 3 TIMES DAILY
Qty: 21 CAPSULE | Refills: 0 | Status: SHIPPED | OUTPATIENT
Start: 2025-03-01

## 2025-03-01 RX ORDER — ACETAMINOPHEN 500 MG
1000 TABLET ORAL ONCE
Status: COMPLETED | OUTPATIENT
Start: 2025-03-01 | End: 2025-03-01

## 2025-03-01 RX ADMIN — ACETAMINOPHEN 1000 MG: 500 TABLET, FILM COATED ORAL at 07:41

## 2025-03-01 RX ADMIN — THIAMINE HYDROCHLORIDE 150 ML/HR: 100 INJECTION, SOLUTION INTRAMUSCULAR; INTRAVENOUS at 02:59

## 2025-03-01 RX ADMIN — BACITRACIN 0.9 G: 500 OINTMENT TOPICAL at 07:17

## 2025-03-01 NOTE — ED PROVIDER NOTES
Subjective   History of Present Illness  44-year-old female found on the ground in a field close to her home transported by EMS to this facility.  Patient is intoxicated and states she was trying to get home and fell.  Patient denies any concussive symptoms but is intoxicated.  Patient reports her last tetanus shot was 3 years ago.  Patient refuses any imaging but will agree to an IV and IV fluids.      Review of Systems   Unable to perform ROS: Mental status change       Past Medical History:   Diagnosis Date    Allergic     Anxiety     Depression     Headache     Irritable bowel syndrome     Miscarriage 03/2019       Allergies   Allergen Reactions    Codeine Irritability and Other (See Comments)     hyperactivity      Penicillins Hives       Past Surgical History:   Procedure Laterality Date    DILATATION AND CURETTAGE  03/2019    r/t miscarriage       Family History   Problem Relation Age of Onset    Hypertension Mother     Hypertension Father     Diabetes Father     Hyperlipidemia Father     Cancer Maternal Grandfather         colon       Social History     Socioeconomic History    Marital status: Single   Tobacco Use    Smoking status: Some Days     Current packs/day: 0.25     Average packs/day: 0.3 packs/day for 10.0 years (2.5 ttl pk-yrs)     Types: Cigarettes    Smokeless tobacco: Never    Tobacco comments:     3-5 cigs per day   Vaping Use    Vaping status: Never Used   Substance and Sexual Activity    Alcohol use: Yes     Comment: 2 times per week    Drug use: Never    Sexual activity: Not Currently     Partners: Male     Comment: None           Objective   Physical Exam  Constitutional:       Comments: Intoxicated 44-year-old female in no acute distress, airway intact   HENT:      Head:      Comments: Left preauricular facial abrasion and 1 cm laceration to the posterior aspect of the oral cartilage exposure injury appreciated.  There is a mild abrasion overlying the mastoid but there is no swelling or  tenderness, TMs are normal to inspection bilaterally  Eyes:      Conjunctiva/sclera: Conjunctivae normal.      Pupils: Pupils are equal, round, and reactive to light.   Cardiovascular:      Rate and Rhythm: Tachycardia present.      Heart sounds: Normal heart sounds.   Pulmonary:      Effort: Pulmonary effort is normal.      Breath sounds: Normal breath sounds.   Abdominal:      General: Bowel sounds are normal. There is no distension.      Palpations: Abdomen is soft.      Tenderness: There is no abdominal tenderness.   Musculoskeletal:      Comments: Mild abrasions to the dorsum of the left hand, no bony tenderness, abrasion/ecchymosis noted to left anterior knee, full range of motion without any pain or tenderness   Skin:     General: Skin is warm and dry.      Capillary Refill: Capillary refill takes less than 2 seconds.   Neurological:      General: No focal deficit present.      Mental Status: She is oriented to person, place, and time.         Laceration Repair    Date/Time: 3/1/2025 6:44 AM    Performed by: Amor Stewart MD  Authorized by: Amor Stewart MD    Consent:     Consent obtained:  Verbal    Consent given by:  Patient    Risks discussed:  Infection, pain and poor cosmetic result  Universal protocol:     Patient identity confirmed:  Verbally with patient  Anesthesia:     Anesthesia method:  Local infiltration    Local anesthetic:  Lidocaine 1% w/o epi  Laceration details:     Location:  Ear    Ear location:  L ear    Length (cm):  1  Pre-procedure details:     Preparation:  Patient was prepped and draped in usual sterile fashion  Exploration:     Wound exploration: wound explored through full range of motion      Contaminated: mild, removed with irrigation.    Treatment:     Amount of cleaning:  Standard    Irrigation solution:  Sterile saline  Skin repair:     Repair method:  Sutures    Suture size:  4-0    Suture material:  Nylon    Suture technique:  Simple interrupted    Number of sutures:   3  Approximation:     Approximation:  Close  Repair type:     Repair type:  Simple  Post-procedure details:     Dressing:  Antibiotic ointment    Procedure completion:  Tolerated             ED Course                                                       Medical Decision Making  Patient with recent ED visit for diverticulitis, placed on Cipro and Flagyl on 2/13/2025.  Patient reports decreased p.o. intake since that time with some diarrhea.  This may account for patient's low serum bicarbonate.  Patient reports abdominal pain resolved.  Patient is clinically sober on reevaluation and declines any further treatment and desires discharge now.    Problems Addressed:  Alcoholic intoxication with complication: complicated acute illness or injury  Dehydration: complicated acute illness or injury  Injury of head, initial encounter: complicated acute illness or injury  Laceration of left ear, initial encounter: complicated acute illness or injury    Amount and/or Complexity of Data Reviewed  External Data Reviewed: notes.     Details: ED encounter from 2/13/2025 reviewed  Labs: ordered.     Details: EtOH 0.288  Radiology: ordered and independent interpretation performed.     Details: No acute injury seen on CT of the head    Risk  OTC drugs.  Prescription drug management.        Final diagnoses:   Injury of head, initial encounter   Laceration of left ear, initial encounter   Alcoholic intoxication with complication   Dehydration       ED Disposition  ED Disposition       ED Disposition   Discharge    Condition   Stable    Comment   --               Sharyn Hager MD  [address] [place] [address]  [phone]    Schedule an appointment as soon as possible for a visit            Medication List        New Prescriptions      clindamycin 300 MG capsule  Commonly known as: CLEOCIN  Take 1 capsule by mouth 3 (Three) Times a Day.               Where to Get Your Medications        These medications were sent  to [facility] #[id number] - [place], [place] - [address] [facility] AT [address] OF [facility] [address] - [phone]  - [phone]  [address] [facility] [place] [address]      Phone: [phone]   clindamycin 300 MG capsule            Amor Stewart MD  03/01/25 3805

## 2025-08-25 ENCOUNTER — OFFICE (OUTPATIENT)
Dept: URBAN - METROPOLITAN AREA CLINIC 64 | Facility: CLINIC | Age: 45
End: 2025-08-25
Payer: COMMERCIAL

## 2025-08-25 VITALS
HEART RATE: 84 BPM | DIASTOLIC BLOOD PRESSURE: 82 MMHG | HEIGHT: 71 IN | WEIGHT: 172 LBS | SYSTOLIC BLOOD PRESSURE: 121 MMHG

## 2025-08-25 DIAGNOSIS — R10.9 UNSPECIFIED ABDOMINAL PAIN: ICD-10-CM

## 2025-08-25 DIAGNOSIS — K57.92 DIVERTICULITIS OF INTESTINE, PART UNSPECIFIED, WITHOUT PERFO: ICD-10-CM

## 2025-08-25 DIAGNOSIS — K58.1 IRRITABLE BOWEL SYNDROME WITH CONSTIPATION: ICD-10-CM

## 2025-08-25 PROCEDURE — 99212 OFFICE O/P EST SF 10 MIN: CPT | Performed by: NURSE PRACTITIONER
